# Patient Record
Sex: FEMALE | Race: BLACK OR AFRICAN AMERICAN | HISPANIC OR LATINO | Employment: PART TIME | URBAN - METROPOLITAN AREA
[De-identification: names, ages, dates, MRNs, and addresses within clinical notes are randomized per-mention and may not be internally consistent; named-entity substitution may affect disease eponyms.]

---

## 2018-01-17 NOTE — MISCELLANEOUS
Provider Comments  Provider Comments:   No show for 45 minute EDX study  Signatures   Electronically signed by : Heather Sommer DO;  Apr 27 2016  3:42PM EST                       (Author)

## 2020-08-18 ENCOUNTER — TRANSCRIBE ORDERS (OUTPATIENT)
Dept: URGENT CARE | Facility: MEDICAL CENTER | Age: 35
End: 2020-08-18

## 2020-08-18 DIAGNOSIS — Z02.1 PHYSICAL EXAM, PRE-EMPLOYMENT: ICD-10-CM

## 2020-08-18 DIAGNOSIS — Z02.1 PHYSICAL EXAM, PRE-EMPLOYMENT: Primary | ICD-10-CM

## 2020-08-18 DIAGNOSIS — Z02.1 PRE-EMPLOYMENT HEALTH SCREENING EXAMINATION: Primary | ICD-10-CM

## 2020-09-08 ENCOUNTER — APPOINTMENT (OUTPATIENT)
Dept: LAB | Facility: MEDICAL CENTER | Age: 35
End: 2020-09-08

## 2020-09-08 DIAGNOSIS — Z02.1 PRE-EMPLOYMENT HEALTH SCREENING EXAMINATION: ICD-10-CM

## 2020-09-08 LAB — RUBV IGG SERPL IA-ACNC: >175 IU/ML

## 2020-09-08 PROCEDURE — 36415 COLL VENOUS BLD VENIPUNCTURE: CPT

## 2020-09-08 PROCEDURE — 86765 RUBEOLA ANTIBODY: CPT

## 2020-09-08 PROCEDURE — 86762 RUBELLA ANTIBODY: CPT

## 2020-09-08 PROCEDURE — 86480 TB TEST CELL IMMUN MEASURE: CPT

## 2020-09-08 PROCEDURE — 86787 VARICELLA-ZOSTER ANTIBODY: CPT

## 2020-09-08 PROCEDURE — 86735 MUMPS ANTIBODY: CPT

## 2020-09-09 LAB
GAMMA INTERFERON BACKGROUND BLD IA-ACNC: 0.01 IU/ML
M TB IFN-G BLD-IMP: NEGATIVE
M TB IFN-G CD4+ BCKGRND COR BLD-ACNC: 0 IU/ML
M TB IFN-G CD4+ BCKGRND COR BLD-ACNC: 0.01 IU/ML
MITOGEN IGNF BCKGRD COR BLD-ACNC: >10 IU/ML

## 2020-09-10 LAB
MEV IGG SER QL: NORMAL
MUV IGG SER QL: NORMAL
VZV IGG SER IA-ACNC: ABNORMAL

## 2020-10-06 ENCOUNTER — OFFICE VISIT (OUTPATIENT)
Dept: URGENT CARE | Facility: MEDICAL CENTER | Age: 35
End: 2020-10-06
Payer: COMMERCIAL

## 2020-10-06 VITALS
DIASTOLIC BLOOD PRESSURE: 76 MMHG | TEMPERATURE: 97.9 F | SYSTOLIC BLOOD PRESSURE: 115 MMHG | OXYGEN SATURATION: 98 % | HEART RATE: 70 BPM | RESPIRATION RATE: 20 BRPM

## 2020-10-06 DIAGNOSIS — J01.00 ACUTE MAXILLARY SINUSITIS, RECURRENCE NOT SPECIFIED: Primary | ICD-10-CM

## 2020-10-06 PROCEDURE — G0382 LEV 3 HOSP TYPE B ED VISIT: HCPCS | Performed by: PHYSICIAN ASSISTANT

## 2020-10-06 RX ORDER — ALBUTEROL SULFATE 2.5 MG/3ML
SOLUTION RESPIRATORY (INHALATION)
COMMUNITY

## 2020-10-06 RX ORDER — AMOXICILLIN AND CLAVULANATE POTASSIUM 875; 125 MG/1; MG/1
1 TABLET, FILM COATED ORAL EVERY 12 HOURS SCHEDULED
Qty: 14 TABLET | Refills: 0 | Status: SHIPPED | OUTPATIENT
Start: 2020-10-06 | End: 2020-10-13

## 2020-10-20 ENCOUNTER — OFFICE VISIT (OUTPATIENT)
Dept: URGENT CARE | Facility: MEDICAL CENTER | Age: 35
End: 2020-10-20
Payer: COMMERCIAL

## 2020-10-20 VITALS
DIASTOLIC BLOOD PRESSURE: 78 MMHG | OXYGEN SATURATION: 98 % | RESPIRATION RATE: 18 BRPM | HEART RATE: 77 BPM | TEMPERATURE: 97.4 F | SYSTOLIC BLOOD PRESSURE: 138 MMHG

## 2020-10-20 DIAGNOSIS — T78.40XA ALLERGIC REACTION, INITIAL ENCOUNTER: Primary | ICD-10-CM

## 2020-10-20 PROCEDURE — G0382 LEV 3 HOSP TYPE B ED VISIT: HCPCS | Performed by: PHYSICIAN ASSISTANT

## 2020-10-20 RX ORDER — PREDNISONE 20 MG/1
40 TABLET ORAL ONCE
Status: COMPLETED | OUTPATIENT
Start: 2020-10-20 | End: 2020-10-20

## 2020-10-20 RX ORDER — PREDNISONE 20 MG/1
20 TABLET ORAL DAILY
Qty: 5 TABLET | Refills: 0 | Status: SHIPPED | OUTPATIENT
Start: 2020-10-20 | End: 2020-10-25

## 2020-10-20 RX ORDER — DIPHENHYDRAMINE HCL 25 MG
25 CAPSULE ORAL ONCE
Status: COMPLETED | OUTPATIENT
Start: 2020-10-20 | End: 2020-10-20

## 2020-10-20 RX ADMIN — PREDNISONE 40 MG: 20 TABLET ORAL at 09:51

## 2020-10-20 RX ADMIN — Medication 25 MG: at 09:51

## 2020-10-29 ENCOUNTER — OFFICE VISIT (OUTPATIENT)
Dept: FAMILY MEDICINE CLINIC | Facility: CLINIC | Age: 35
End: 2020-10-29
Payer: COMMERCIAL

## 2020-10-29 VITALS
RESPIRATION RATE: 16 BRPM | SYSTOLIC BLOOD PRESSURE: 112 MMHG | HEIGHT: 62 IN | HEART RATE: 68 BPM | OXYGEN SATURATION: 96 % | DIASTOLIC BLOOD PRESSURE: 74 MMHG | BODY MASS INDEX: 43.61 KG/M2 | WEIGHT: 237 LBS | TEMPERATURE: 98 F

## 2020-10-29 DIAGNOSIS — T75.3XXA MOTION SICKNESS, INITIAL ENCOUNTER: ICD-10-CM

## 2020-10-29 DIAGNOSIS — E66.01 CLASS 3 SEVERE OBESITY DUE TO EXCESS CALORIES WITHOUT SERIOUS COMORBIDITY WITH BODY MASS INDEX (BMI) OF 40.0 TO 44.9 IN ADULT (HCC): Primary | ICD-10-CM

## 2020-10-29 DIAGNOSIS — N94.6 DYSMENORRHEA: ICD-10-CM

## 2020-10-29 DIAGNOSIS — Z79.1 NSAID LONG-TERM USE: ICD-10-CM

## 2020-10-29 PROCEDURE — 99204 OFFICE O/P NEW MOD 45 MIN: CPT | Performed by: FAMILY MEDICINE

## 2020-10-29 RX ORDER — MELOXICAM 15 MG/1
15 TABLET ORAL DAILY
Qty: 30 TABLET | Refills: 0 | Status: SHIPPED | OUTPATIENT
Start: 2020-10-29 | End: 2020-11-12 | Stop reason: ALTCHOICE

## 2020-10-29 RX ORDER — ALBUTEROL SULFATE 90 UG/1
2 AEROSOL, METERED RESPIRATORY (INHALATION) EVERY 6 HOURS PRN
COMMUNITY

## 2020-10-29 RX ORDER — SCOLOPAMINE TRANSDERMAL SYSTEM 1 MG/1
1 PATCH, EXTENDED RELEASE TRANSDERMAL
Qty: 4 PATCH | Refills: 0 | Status: SHIPPED | OUTPATIENT
Start: 2020-10-29 | End: 2020-11-12 | Stop reason: ALTCHOICE

## 2020-10-30 ENCOUNTER — TELEPHONE (OUTPATIENT)
Dept: FAMILY MEDICINE CLINIC | Facility: CLINIC | Age: 35
End: 2020-10-30

## 2020-11-12 ENCOUNTER — CONSULT (OUTPATIENT)
Dept: BARIATRICS | Facility: CLINIC | Age: 35
End: 2020-11-12
Payer: COMMERCIAL

## 2020-11-12 VITALS
SYSTOLIC BLOOD PRESSURE: 118 MMHG | TEMPERATURE: 98.9 F | BODY MASS INDEX: 43.76 KG/M2 | HEIGHT: 62 IN | HEART RATE: 65 BPM | WEIGHT: 237.8 LBS | DIASTOLIC BLOOD PRESSURE: 78 MMHG

## 2020-11-12 DIAGNOSIS — E66.01 CLASS 3 SEVERE OBESITY DUE TO EXCESS CALORIES WITHOUT SERIOUS COMORBIDITY WITH BODY MASS INDEX (BMI) OF 40.0 TO 44.9 IN ADULT (HCC): ICD-10-CM

## 2020-11-12 DIAGNOSIS — E66.01 OBESITY, CLASS III, BMI 40-49.9 (MORBID OBESITY) (HCC): Primary | ICD-10-CM

## 2020-11-12 PROBLEM — E66.813 OBESITY, CLASS III, BMI 40-49.9 (MORBID OBESITY): Status: ACTIVE | Noted: 2020-11-12

## 2020-11-12 PROCEDURE — 99243 OFF/OP CNSLTJ NEW/EST LOW 30: CPT | Performed by: PHYSICIAN ASSISTANT

## 2020-11-12 RX ORDER — NICOTINE POLACRILEX 2 MG
GUM BUCCAL
COMMUNITY
End: 2022-06-01

## 2020-11-12 RX ORDER — MULTIVIT-MIN/IRON FUM/FOLIC AC 7.5 MG-4
1 TABLET ORAL DAILY
COMMUNITY

## 2020-12-08 ENCOUNTER — LAB (OUTPATIENT)
Dept: LAB | Facility: MEDICAL CENTER | Age: 35
End: 2020-12-08
Payer: COMMERCIAL

## 2020-12-08 ENCOUNTER — APPOINTMENT (OUTPATIENT)
Dept: RADIOLOGY | Facility: MEDICAL CENTER | Age: 35
End: 2020-12-08
Payer: COMMERCIAL

## 2020-12-08 ENCOUNTER — TRANSCRIBE ORDERS (OUTPATIENT)
Dept: ADMINISTRATIVE | Facility: HOSPITAL | Age: 35
End: 2020-12-08

## 2020-12-08 DIAGNOSIS — J45.50 SEVERE PERSISTENT ASTHMA, UNSPECIFIED WHETHER COMPLICATED: ICD-10-CM

## 2020-12-08 DIAGNOSIS — E66.01 CLASS 3 SEVERE OBESITY DUE TO EXCESS CALORIES WITHOUT SERIOUS COMORBIDITY WITH BODY MASS INDEX (BMI) OF 40.0 TO 44.9 IN ADULT (HCC): ICD-10-CM

## 2020-12-08 DIAGNOSIS — L50.1 IDIOPATHIC URTICARIA: Primary | ICD-10-CM

## 2020-12-08 DIAGNOSIS — L50.1 IDIOPATHIC URTICARIA: ICD-10-CM

## 2020-12-08 LAB
25(OH)D3 SERPL-MCNC: 23.9 NG/ML (ref 30–100)
ALBUMIN SERPL BCP-MCNC: 4 G/DL (ref 3.5–5)
ALP SERPL-CCNC: 69 U/L (ref 46–116)
ALT SERPL W P-5'-P-CCNC: 20 U/L (ref 12–78)
ANION GAP SERPL CALCULATED.3IONS-SCNC: 5 MMOL/L (ref 4–13)
AST SERPL W P-5'-P-CCNC: 14 U/L (ref 5–45)
BASOPHILS # BLD AUTO: 0.03 THOUSANDS/ΜL (ref 0–0.1)
BASOPHILS NFR BLD AUTO: 1 % (ref 0–1)
BILIRUB SERPL-MCNC: 0.69 MG/DL (ref 0.2–1)
BUN SERPL-MCNC: 13 MG/DL (ref 5–25)
C3 SERPL-MCNC: 131 MG/DL (ref 90–180)
C4 SERPL-MCNC: 29 MG/DL (ref 10–40)
CALCIUM SERPL-MCNC: 9.5 MG/DL (ref 8.3–10.1)
CHLORIDE SERPL-SCNC: 106 MMOL/L (ref 100–108)
CO2 SERPL-SCNC: 27 MMOL/L (ref 21–32)
CREAT SERPL-MCNC: 0.83 MG/DL (ref 0.6–1.3)
CRP SERPL QL: 18.7 MG/L
EOSINOPHIL # BLD AUTO: 0.24 THOUSAND/ΜL (ref 0–0.61)
EOSINOPHIL NFR BLD AUTO: 4 % (ref 0–6)
ERYTHROCYTE [DISTWIDTH] IN BLOOD BY AUTOMATED COUNT: 13.3 % (ref 11.6–15.1)
EST. AVERAGE GLUCOSE BLD GHB EST-MCNC: 105 MG/DL
GFR SERPL CREATININE-BSD FRML MDRD: 104 ML/MIN/1.73SQ M
GLUCOSE SERPL-MCNC: 76 MG/DL (ref 65–140)
HBA1C MFR BLD: 5.3 %
HCT VFR BLD AUTO: 43 % (ref 34.8–46.1)
HGB BLD-MCNC: 12.9 G/DL (ref 11.5–15.4)
IMM GRANULOCYTES # BLD AUTO: 0.01 THOUSAND/UL (ref 0–0.2)
IMM GRANULOCYTES NFR BLD AUTO: 0 % (ref 0–2)
LYMPHOCYTES # BLD AUTO: 1.41 THOUSANDS/ΜL (ref 0.6–4.47)
LYMPHOCYTES NFR BLD AUTO: 22 % (ref 14–44)
MCH RBC QN AUTO: 24.8 PG (ref 26.8–34.3)
MCHC RBC AUTO-ENTMCNC: 30 G/DL (ref 31.4–37.4)
MCV RBC AUTO: 83 FL (ref 82–98)
MONOCYTES # BLD AUTO: 0.51 THOUSAND/ΜL (ref 0.17–1.22)
MONOCYTES NFR BLD AUTO: 8 % (ref 4–12)
NEUTROPHILS # BLD AUTO: 4.32 THOUSANDS/ΜL (ref 1.85–7.62)
NEUTS SEG NFR BLD AUTO: 65 % (ref 43–75)
NRBC BLD AUTO-RTO: 0 /100 WBCS
PLATELET # BLD AUTO: 248 THOUSANDS/UL (ref 149–390)
PMV BLD AUTO: 12.2 FL (ref 8.9–12.7)
POTASSIUM SERPL-SCNC: 4.1 MMOL/L (ref 3.5–5.3)
PROT SERPL-MCNC: 7.9 G/DL (ref 6.4–8.2)
RBC # BLD AUTO: 5.2 MILLION/UL (ref 3.81–5.12)
RHEUMATOID FACT SER QL LA: NEGATIVE
SODIUM SERPL-SCNC: 138 MMOL/L (ref 136–145)
TSH SERPL DL<=0.05 MIU/L-ACNC: 1.38 UIU/ML (ref 0.36–3.74)
WBC # BLD AUTO: 6.52 THOUSAND/UL (ref 4.31–10.16)

## 2020-12-08 PROCEDURE — 84165 PROTEIN E-PHORESIS SERUM: CPT

## 2020-12-08 PROCEDURE — 86430 RHEUMATOID FACTOR TEST QUAL: CPT

## 2020-12-08 PROCEDURE — 86376 MICROSOMAL ANTIBODY EACH: CPT

## 2020-12-08 PROCEDURE — 80053 COMPREHEN METABOLIC PANEL: CPT | Performed by: ALLERGY & IMMUNOLOGY

## 2020-12-08 PROCEDURE — 83883 ASSAY NEPHELOMETRY NOT SPEC: CPT

## 2020-12-08 PROCEDURE — 86800 THYROGLOBULIN ANTIBODY: CPT

## 2020-12-08 PROCEDURE — 83520 IMMUNOASSAY QUANT NOS NONAB: CPT

## 2020-12-08 PROCEDURE — 85025 COMPLETE CBC W/AUTO DIFF WBC: CPT | Performed by: ALLERGY & IMMUNOLOGY

## 2020-12-08 PROCEDURE — 86140 C-REACTIVE PROTEIN: CPT | Performed by: ALLERGY & IMMUNOLOGY

## 2020-12-08 PROCEDURE — 83036 HEMOGLOBIN GLYCOSYLATED A1C: CPT

## 2020-12-08 PROCEDURE — 71046 X-RAY EXAM CHEST 2 VIEWS: CPT

## 2020-12-08 PROCEDURE — 36415 COLL VENOUS BLD VENIPUNCTURE: CPT | Performed by: ALLERGY & IMMUNOLOGY

## 2020-12-08 PROCEDURE — 84165 PROTEIN E-PHORESIS SERUM: CPT | Performed by: PATHOLOGY

## 2020-12-08 PROCEDURE — 86038 ANTINUCLEAR ANTIBODIES: CPT

## 2020-12-08 PROCEDURE — 84443 ASSAY THYROID STIM HORMONE: CPT | Performed by: ALLERGY & IMMUNOLOGY

## 2020-12-08 PROCEDURE — 82785 ASSAY OF IGE: CPT

## 2020-12-08 PROCEDURE — 84432 ASSAY OF THYROGLOBULIN: CPT

## 2020-12-08 PROCEDURE — 82306 VITAMIN D 25 HYDROXY: CPT

## 2020-12-08 PROCEDURE — 86160 COMPLEMENT ANTIGEN: CPT

## 2020-12-08 PROCEDURE — 86162 COMPLEMENT TOTAL (CH50): CPT

## 2020-12-09 LAB
CH50 SERPL-ACNC: 57 U/ML
KAPPA LC FREE SER-MCNC: 15.7 MG/L (ref 3.3–19.4)
KAPPA LC FREE/LAMBDA FREE SER: 0.94 {RATIO} (ref 0.26–1.65)
LAMBDA LC FREE SERPL-MCNC: 16.7 MG/L (ref 5.7–26.3)
RYE IGE QN: NEGATIVE
THYROGLOB AB SERPL-ACNC: <1 IU/ML (ref 0–0.9)
THYROGLOB AB SERPL-ACNC: <1 IU/ML (ref 0–0.9)
THYROGLOB SERPL-MCNC: 9.6 NG/ML (ref 1.5–38.5)
THYROPEROXIDASE AB SERPL-ACNC: <9 IU/ML (ref 0–34)
TOTAL IGE SMQN RAST: 597 KU/L (ref 0–113)

## 2020-12-10 LAB
ALBUMIN SERPL ELPH-MCNC: 4.2 G/DL (ref 3.5–5)
ALBUMIN SERPL ELPH-MCNC: 58.3 % (ref 52–65)
ALPHA1 GLOB SERPL ELPH-MCNC: 0.35 G/DL (ref 0.1–0.4)
ALPHA1 GLOB SERPL ELPH-MCNC: 4.8 % (ref 2.5–5)
ALPHA2 GLOB SERPL ELPH-MCNC: 0.78 G/DL (ref 0.4–1.2)
ALPHA2 GLOB SERPL ELPH-MCNC: 10.9 % (ref 7–13)
BETA GLOB ABNORMAL SERPL ELPH-MCNC: 0.43 G/DL (ref 0.4–0.8)
BETA1 GLOB SERPL ELPH-MCNC: 6 % (ref 5–13)
BETA2 GLOB SERPL ELPH-MCNC: 4.3 % (ref 2–8)
BETA2+GAMMA GLOB SERPL ELPH-MCNC: 0.31 G/DL (ref 0.2–0.5)
GAMMA GLOB ABNORMAL SERPL ELPH-MCNC: 1.13 G/DL (ref 0.5–1.6)
GAMMA GLOB SERPL ELPH-MCNC: 15.7 % (ref 12–22)
IGG/ALB SER: 1.4 {RATIO} (ref 1.1–1.8)
PROT PATTERN SERPL ELPH-IMP: NORMAL
PROT SERPL-MCNC: 7.2 G/DL (ref 6.4–8.2)
TRYPTASE SERPL-MCNC: 6.1 UG/L (ref 2.2–13.2)

## 2020-12-14 ENCOUNTER — OFFICE VISIT (OUTPATIENT)
Dept: OBGYN CLINIC | Facility: MEDICAL CENTER | Age: 35
End: 2020-12-14
Payer: COMMERCIAL

## 2020-12-14 VITALS — WEIGHT: 240 LBS | DIASTOLIC BLOOD PRESSURE: 62 MMHG | SYSTOLIC BLOOD PRESSURE: 118 MMHG | BODY MASS INDEX: 43.9 KG/M2

## 2020-12-14 DIAGNOSIS — B96.89 BACTERIAL VAGINOSIS: ICD-10-CM

## 2020-12-14 DIAGNOSIS — N76.0 BACTERIAL VAGINOSIS: ICD-10-CM

## 2020-12-14 DIAGNOSIS — B37.3 YEAST VAGINITIS: ICD-10-CM

## 2020-12-14 DIAGNOSIS — Z11.3 SCREEN FOR STD (SEXUALLY TRANSMITTED DISEASE): Primary | ICD-10-CM

## 2020-12-14 PROBLEM — E55.9 VITAMIN D DEFICIENCY: Status: ACTIVE | Noted: 2018-10-24

## 2020-12-14 PROBLEM — Z91.018 MULTIPLE FOOD ALLERGIES: Status: ACTIVE | Noted: 2018-10-24

## 2020-12-14 PROCEDURE — 99201 PR OFFICE OUTPATIENT NEW 10 MINUTES: CPT | Performed by: PHYSICIAN ASSISTANT

## 2020-12-14 PROCEDURE — 87591 N.GONORRHOEAE DNA AMP PROB: CPT | Performed by: PHYSICIAN ASSISTANT

## 2020-12-14 PROCEDURE — 87491 CHLMYD TRACH DNA AMP PROBE: CPT | Performed by: PHYSICIAN ASSISTANT

## 2020-12-14 RX ORDER — PREDNISONE 10 MG/1
TABLET ORAL
COMMUNITY
Start: 2020-12-07 | End: 2021-02-04 | Stop reason: ALTCHOICE

## 2020-12-14 RX ORDER — FLUCONAZOLE 150 MG/1
TABLET ORAL
Qty: 2 TABLET | Refills: 0 | Status: SHIPPED | OUTPATIENT
Start: 2020-12-14 | End: 2020-12-17

## 2020-12-14 RX ORDER — TINIDAZOLE 500 MG/1
TABLET ORAL
Qty: 10 TABLET | Refills: 0 | Status: SHIPPED | OUTPATIENT
Start: 2020-12-14 | End: 2020-12-19

## 2020-12-15 ENCOUNTER — TELEPHONE (OUTPATIENT)
Dept: OBGYN CLINIC | Facility: MEDICAL CENTER | Age: 35
End: 2020-12-15

## 2020-12-18 LAB
C TRACH DNA SPEC QL NAA+PROBE: NEGATIVE
N GONORRHOEA DNA SPEC QL NAA+PROBE: NEGATIVE

## 2020-12-23 ENCOUNTER — TELEPHONE (OUTPATIENT)
Dept: ADMINISTRATIVE | Facility: OTHER | Age: 35
End: 2020-12-23

## 2020-12-23 ENCOUNTER — OFFICE VISIT (OUTPATIENT)
Dept: FAMILY MEDICINE CLINIC | Facility: CLINIC | Age: 35
End: 2020-12-23
Payer: COMMERCIAL

## 2020-12-23 VITALS
HEART RATE: 56 BPM | TEMPERATURE: 98 F | WEIGHT: 237 LBS | OXYGEN SATURATION: 97 % | SYSTOLIC BLOOD PRESSURE: 88 MMHG | DIASTOLIC BLOOD PRESSURE: 60 MMHG | RESPIRATION RATE: 16 BRPM | BODY MASS INDEX: 43.61 KG/M2 | HEIGHT: 62 IN

## 2020-12-23 DIAGNOSIS — S16.1XXA STRAIN OF NECK MUSCLE, INITIAL ENCOUNTER: Primary | ICD-10-CM

## 2020-12-23 PROCEDURE — 99214 OFFICE O/P EST MOD 30 MIN: CPT | Performed by: INTERNAL MEDICINE

## 2020-12-23 RX ORDER — CYCLOBENZAPRINE HCL 10 MG
10 TABLET ORAL 3 TIMES DAILY PRN
Qty: 15 TABLET | Refills: 0 | Status: SHIPPED | OUTPATIENT
Start: 2020-12-23 | End: 2021-04-29 | Stop reason: ALTCHOICE

## 2020-12-23 RX ORDER — FEXOFENADINE HYDROCHLORIDE 60 MG/1
60 TABLET, FILM COATED ORAL DAILY
COMMUNITY
End: 2021-04-29 | Stop reason: ALTCHOICE

## 2020-12-23 RX ORDER — METHYLPREDNISOLONE 4 MG/1
TABLET ORAL
Qty: 21 EACH | Refills: 0 | Status: SHIPPED | OUTPATIENT
Start: 2020-12-23 | End: 2021-02-04 | Stop reason: ALTCHOICE

## 2020-12-23 RX ORDER — MELOXICAM 15 MG/1
15 TABLET ORAL DAILY
Qty: 30 TABLET | Refills: 1 | Status: SHIPPED | OUTPATIENT
Start: 2020-12-23 | End: 2021-04-29 | Stop reason: HOSPADM

## 2020-12-23 NOTE — TELEPHONE ENCOUNTER
----- Message from Fahad Duffy sent at 12/22/2020  2:34 PM EST -----  Regarding: Jojo Her Prac  Contact: [phone]  12/22/20 2:34 PM    Hello, our patient Harper Combs has had Pap Smear (HPV) aka Cervical Cancer Screening completed/performed  Please assist in updating the patient chart by pulling the Care Everywhere (CE) document  The date of service is 09/10/2019       Thank you,  ASHLEY Duffy PG

## 2021-01-11 NOTE — TELEPHONE ENCOUNTER
Upon review of the In Basket request we were able to locate, review, and update the patient chart as requested for Pap Smear (HPV) aka Cervical Cancer Screening  Any additional questions or concerns should be emailed to the Practice Liaisons via [email or web address] email, please do not reply via In Basket      Thank you  Elvis Amezcua MA

## 2021-02-04 ENCOUNTER — TELEMEDICINE (OUTPATIENT)
Dept: FAMILY MEDICINE CLINIC | Facility: CLINIC | Age: 36
End: 2021-02-04
Payer: COMMERCIAL

## 2021-02-04 DIAGNOSIS — J02.9 PHARYNGITIS, UNSPECIFIED ETIOLOGY: Primary | ICD-10-CM

## 2021-02-04 DIAGNOSIS — B34.9 VIRAL INFECTION, UNSPECIFIED: ICD-10-CM

## 2021-02-04 DIAGNOSIS — Z03.818 ENCOUNTER FOR OBSERVATION FOR SUSPECTED EXPOSURE TO OTHER BIOLOGICAL AGENTS RULED OUT: ICD-10-CM

## 2021-02-04 PROCEDURE — 87635 SARS-COV-2 COVID-19 AMP PRB: CPT | Performed by: PHYSICIAN ASSISTANT

## 2021-02-04 PROCEDURE — 99214 OFFICE O/P EST MOD 30 MIN: CPT | Performed by: PHYSICIAN ASSISTANT

## 2021-02-04 RX ORDER — AMOXICILLIN 500 MG/1
500 CAPSULE ORAL EVERY 8 HOURS SCHEDULED
Qty: 30 CAPSULE | Refills: 0 | Status: SHIPPED | OUTPATIENT
Start: 2021-02-04 | End: 2021-02-14

## 2021-02-04 NOTE — PROGRESS NOTES
COVID-19 Virtual Visit     Assessment/Plan:    Problem List Items Addressed This Visit     None      Visit Diagnoses     Pharyngitis, unspecified etiology    -  Primary    Relevant Medications    amoxicillin (AMOXIL) 500 mg capsule    Encounter for observation for suspected exposure to other biological agents ruled out        Relevant Orders    Novel Coronavirus (Covid-19),PCR [facility] - Collected in Office    Viral infection, unspecified        Relevant Orders    Novel Coronavirus (Covid-19),PCR [facility] - Collected in Office         Disposition:     I recommended the patient to come to our office to perform PCR testing for COVID-19  I have spent 7 minutes directly with the patient  Encounter provider Kristina Bales PA-C    Provider located at [address] [place] [id number] [phone]    Recent Visits  No visits were found meeting these conditions  Showing recent visits within past 7 days and meeting all other requirements     Today's Visits  Date Type Provider Dept   02/04/21 Telemedicine Kristina Bales PA-C  José Miguel Miller   Showing today's visits and meeting all other requirements     Future Appointments  No visits were found meeting these conditions  Showing future appointments within next 150 days and meeting all other requirements      This virtual check-in was done via [employer] and patient was informed that this is not a secure, HIPAA-compliant platform  She agrees to proceed  Patient agrees to participate in a virtual check in via telephone or video visit instead of presenting to the office to address urgent/immediate medical needs  Patient is aware this is a billable service  After connecting through [facility], the patient was identified by name and date of birth  Kaye Fischer was informed that this was a telemedicine visit and that the exam was being conducted confidentially over secure lines   My office door was closed  No one else was in the room  Paradise Denise acknowledged consent and understanding of privacy and security of the telemedicine visit  I informed the patient that I have reviewed her record in Epic and presented the opportunity for her to ask any questions regarding the visit today  The patient agreed to participate  Subjective:   Paradise Denise is a 38 y o  female who is concerned about COVID-19  Patient's symptoms include chills, fatigue, sore throat and myalgias  Patient denies fever, anosmia, loss of taste, cough, nausea, vomiting and diarrhea       Date of symptom onset: 2/2/2021    Exposure:   Contact with a person who is under investigation (PUI) for or who is positive for COVID-19 within the last 14 days?: No    Hospitalized recently for fever and/or lower respiratory symptoms?: No      Currently a healthcare worker that is involved in direct patient care?: No      Works in a special setting where the risk of COVID-19 transmission may be high? (this may include long-term care, correctional and group home facilities; homeless shelters; assisted-living facilities and group homes ): No      Resident in a special setting where the risk of COVID-19 transmission may be high? (this may include long-term care, correctional and group home facilities; homeless shelters; assisted-living facilities and group homes ): No      No results found for: [facility] [address] [facility], [address] [place] [address],[address], [place] [address]  Past Medical History:   Diagnosis Date    Allergic     Asthma     GERD (gastroesophageal reflux disease)     Morbid obesity with BMI of 40 0-44 9, adult ([facility] [address] )      Past Surgical History:   Procedure Laterality Date    CHOLECYSTECTOMY      TONSILECTOMY AND ADNOIDECTOMY      TUBAL LIGATION       Current Outpatient Medications   Medication Sig Dispense Refill    albuterol (2 5 mg/3 mL) 0 083 % nebulizer solution Inhale      albuterol (PROVENTIL HFA,VENTOLIN HFA) 90 mcg/act inhaler Inhale 2 puffs every 6 (six) hours as needed for wheezing      amoxicillin (AMOXIL) 500 mg capsule Take 1 capsule (500 mg total) by mouth every 8 (eight) hours for 10 days 30 capsule 0    Biotin 1 MG CAPS Take by mouth      cyclobenzaprine (FLEXERIL) 10 mg tablet Take 1 tablet (10 mg total) by mouth 3 (three) times a day as needed for muscle spasms 15 tablet 0    fexofenadine (ALLEGRA) 60 MG tablet Take 60 mg by mouth daily      meloxicam (MOBIC) 15 mg tablet Take 1 tablet (15 mg total) by mouth daily 30 tablet 1    Multiple Vitamins-Minerals (multivitamin with minerals) tablet Take 1 tablet by mouth daily       No current facility-administered medications for this visit  Allergies   Allergen Reactions    Lac Bovis Angioedema    Shrimp Extract Allergy Skin Test Angioedema    Banana Other (See Comments)     ulcerations    Fruit Extracts      Annotation - [id number]: bananas, kiwi, pineapples    Kiwi Extract Other (See Comments)     tongue swelling    Latex Other (See Comments)     pt warned to avoid due to food allergies    Pineapple Hives    Pollen Extract        Review of Systems   Constitutional: Positive for chills and fatigue  Negative for fever  HENT: Positive for sore throat  Respiratory: Negative for cough  Gastrointestinal: Negative for diarrhea, nausea and vomiting  Musculoskeletal: Positive for myalgias  Objective: There were no vitals filed for this visit  Physical Exam  Constitutional:       General: She is not in acute distress  Appearance: She is obese  She is not ill-appearing  HENT:      Head: Normocephalic and atraumatic  Right Ear: External ear normal       Left Ear: External ear normal    Pulmonary:      Effort: Pulmonary effort is normal    Skin:     Coloration: Skin is not pale  Findings: No erythema  Neurological:      Mental Status: She is oriented to person, place, and time         VIRTUAL VISIT DISCLAIMER    Judah Bruce acknowledges that she has consented to an online visit or consultation  She understands that the online visit is based solely on information provided by her, and that, in the absence of a face-to-face physical evaluation by the physician, the diagnosis she receives is both limited and provisional in terms of accuracy and completeness  This is not intended to replace a full medical face-to-face evaluation by the physician  Akin Mcrae understands and accepts these terms

## 2021-02-05 LAB — SARS-COV-2 RNA RESP QL NAA+PROBE: NEGATIVE

## 2021-02-10 ENCOUNTER — OFFICE VISIT (OUTPATIENT)
Dept: URGENT CARE | Facility: MEDICAL CENTER | Age: 36
End: 2021-02-10
Payer: COMMERCIAL

## 2021-02-10 VITALS
TEMPERATURE: 98 F | SYSTOLIC BLOOD PRESSURE: 123 MMHG | BODY MASS INDEX: 44.35 KG/M2 | OXYGEN SATURATION: 96 % | RESPIRATION RATE: 18 BRPM | HEIGHT: 62 IN | WEIGHT: 241 LBS | DIASTOLIC BLOOD PRESSURE: 69 MMHG | HEART RATE: 70 BPM

## 2021-02-10 DIAGNOSIS — R22.0 SWOLLEN LIP: Primary | ICD-10-CM

## 2021-02-10 PROCEDURE — G0382 LEV 3 HOSP TYPE B ED VISIT: HCPCS | Performed by: PHYSICIAN ASSISTANT

## 2021-02-10 RX ORDER — MONTELUKAST SODIUM 10 MG/1
10 TABLET ORAL EVERY EVENING
COMMUNITY
Start: 2021-01-19 | End: 2021-04-29 | Stop reason: ALTCHOICE

## 2021-02-10 RX ORDER — PREDNISONE 20 MG/1
40 TABLET ORAL DAILY
Qty: 10 TABLET | Refills: 0 | Status: SHIPPED | OUTPATIENT
Start: 2021-02-10 | End: 2021-02-15

## 2021-02-11 NOTE — PATIENT INSTRUCTIONS
Swollen lip  Prednisone 40 mg daily x 5 days  Follow up with PCP in 3-5 days  Proceed to  ER if symptoms worsen

## 2021-02-11 NOTE — PROGRESS NOTES
[facility]        NAME: Alka Archuleta is a 38 y o  female  : 1985    MRN: [id number]  DATE: February 10, 2021  TIME: 9:10 PM    Assessment and Plan   Swollen lip [R22 0]  1  Swollen lip  predniSONE 20 mg tablet         Patient Instructions     Swollen lip  Prednisone 40 mg daily x 5 days  Follow up with PCP in 3-5 days  Proceed to  ER if symptoms worsen  Chief Complaint     Chief Complaint   Patient presents with    Allergic Reaction     Pt  has swelling to her eyes and lower lip that began this evening  History of Present Illness       37 y/o female presents c/o swelling of lip and eyelid  Patient states she gets this reaction with cold weather  Denies difficulty swallowing, wheezing  States she has used prednisone for these episodes in the past      Review of Systems   Review of Systems   Constitutional: Negative  HENT: Negative  Eyes: Negative  Respiratory: Negative  Negative for cough, chest tightness, shortness of breath, wheezing and stridor  Cardiovascular: Negative  Negative for chest pain, palpitations and leg swelling           Current Medications       Current Outpatient Medications:     albuterol (2 5 mg/3 mL) 0 083 % nebulizer solution, Inhale, Disp: , Rfl:     albuterol (PROVENTIL HFA,VENTOLIN HFA) 90 mcg/act inhaler, Inhale 2 puffs every 6 (six) hours as needed for wheezing, Disp: , Rfl:     amoxicillin (AMOXIL) 500 mg capsule, Take 1 capsule (500 mg total) by mouth every 8 (eight) hours for 10 days, Disp: 30 capsule, Rfl: 0    fexofenadine (ALLEGRA) 60 MG tablet, Take 60 mg by mouth daily, Disp: , Rfl:     montelukast (SINGULAIR) 10 mg tablet, Take 10 mg by mouth every evening, Disp: , Rfl:     Biotin 1 MG CAPS, Take by mouth, Disp: , Rfl:     cyclobenzaprine (FLEXERIL) 10 mg tablet, Take 1 tablet (10 mg total) by mouth 3 (three) times a day as needed for muscle spasms (Patient not taking: Reported on 2/10/2021), Disp: 15 tablet, Rfl: 0   meloxicam (MOBIC) 15 mg tablet, Take 1 tablet (15 mg total) by mouth daily (Patient not taking: Reported on 2/10/2021), Disp: 30 tablet, Rfl: 1    Multiple Vitamins-Minerals (multivitamin with minerals) tablet, Take 1 tablet by mouth daily, Disp: , Rfl:     predniSONE 20 mg tablet, Take 2 tablets (40 mg total) by mouth daily for 5 days, Disp: 10 tablet, Rfl: 0    Current Allergies     Allergies as of 02/10/2021 - Reviewed 02/10/2021   Allergen Reaction Noted    Lac bovis Angioedema 02/01/2019    Shrimp extract allergy skin test Angioedema 02/01/2019    Banana Other (See Comments) 03/22/2014    Fruit extracts  12/17/2013    Kiwi extract Other (See Comments) 03/22/2014    Latex Other (See Comments) 03/22/2014    Pineapple Hives 03/22/2014    Pollen extract  06/13/2014            The following portions of the patient's history were reviewed and updated as appropriate: allergies, current medications, past family history, past medical history, past social history, past surgical history and problem list      Past Medical History:   Diagnosis Date    Allergic     Asthma     GERD (gastroesophageal reflux disease)     Morbid obesity with BMI of 40 0-44 9, adult ([facility] [address] )        Past Surgical History:   Procedure Laterality Date    CHOLECYSTECTOMY      TONSILECTOMY AND ADNOIDECTOMY      TUBAL LIGATION         Family History   Problem Relation Age of Onset    Diabetes Mother     Hypertension Mother     Hypertension Father     Cancer Father         melanoma     Seizures Sister     Diabetes Maternal Grandmother     Hypertension Maternal Grandmother     Cancer Maternal Grandmother     Heart disease Maternal Grandmother     Thyroid disease Neg Hx     Stroke Neg Hx          Medications have been verified          Objective   /69   Pulse 70   Temp 98 °F (36 7 °C)   Resp 18   Ht 5' 2" (1 575 m)   Wt 109 kg (241 lb)   SpO2 96%   BMI 44 08 kg/m²        Physical Exam     Physical Exam  Constitutional: General: She is not in acute distress  Appearance: She is well-developed  She is not diaphoretic  HENT:      Head: Normocephalic and atraumatic  Right Ear: Hearing, tympanic membrane, ear canal and external ear normal       Left Ear: Hearing, tympanic membrane, ear canal and external ear normal       Mouth/Throat:      Lips: Pink  Mouth: Mucous membranes are moist       Palate: No mass and lesions  Pharynx: Uvula midline  No pharyngeal swelling or oropharyngeal exudate  Eyes:      Conjunctiva/sclera: Conjunctivae normal       Pupils: Pupils are equal, round, and reactive to light  Neck:      Musculoskeletal: Normal range of motion and neck supple  Cardiovascular:      Rate and Rhythm: Normal rate and regular rhythm  Heart sounds: Normal heart sounds  Pulmonary:      Effort: Pulmonary effort is normal  No respiratory distress  Breath sounds: Normal breath sounds  No stridor  No wheezing, rhonchi or rales  Chest:      Chest wall: No tenderness  Lymphadenopathy:      Cervical: No cervical adenopathy

## 2021-04-10 ENCOUNTER — IMMUNIZATIONS (OUTPATIENT)
Dept: FAMILY MEDICINE CLINIC | Facility: HOSPITAL | Age: 36
End: 2021-04-10

## 2021-04-10 DIAGNOSIS — Z23 ENCOUNTER FOR IMMUNIZATION: Primary | ICD-10-CM

## 2021-04-10 PROCEDURE — 0001A SARS-COV-2 / COVID-19 MRNA VACCINE (PFIZER-BIONTECH) 30 MCG: CPT

## 2021-04-10 PROCEDURE — 91300 SARS-COV-2 / COVID-19 MRNA VACCINE (PFIZER-BIONTECH) 30 MCG: CPT

## 2021-04-20 ENCOUNTER — APPOINTMENT (OUTPATIENT)
Dept: LAB | Facility: MEDICAL CENTER | Age: 36
End: 2021-04-20
Payer: COMMERCIAL

## 2021-04-20 ENCOUNTER — TRANSCRIBE ORDERS (OUTPATIENT)
Dept: ADMINISTRATIVE | Facility: HOSPITAL | Age: 36
End: 2021-04-20

## 2021-04-20 DIAGNOSIS — Z00.8 HEALTH EXAMINATION IN POPULATION SURVEYS: ICD-10-CM

## 2021-04-20 DIAGNOSIS — Z00.8 HEALTH EXAMINATION IN POPULATION SURVEYS: Primary | ICD-10-CM

## 2021-04-20 DIAGNOSIS — E66.01 CLASS 3 SEVERE OBESITY DUE TO EXCESS CALORIES WITHOUT SERIOUS COMORBIDITY WITH BODY MASS INDEX (BMI) OF 40.0 TO 44.9 IN ADULT (HCC): ICD-10-CM

## 2021-04-20 DIAGNOSIS — E66.01 OBESITY, CLASS III, BMI 40-49.9 (MORBID OBESITY) (HCC): ICD-10-CM

## 2021-04-20 LAB
CHOLEST SERPL-MCNC: 155 MG/DL (ref 50–200)
EST. AVERAGE GLUCOSE BLD GHB EST-MCNC: 111 MG/DL
HBA1C MFR BLD: 5.5 %
HDLC SERPL-MCNC: 58 MG/DL
INSULIN SERPL-ACNC: 20.5 MU/L (ref 3–25)
LDLC SERPL CALC-MCNC: 74 MG/DL (ref 0–100)
NONHDLC SERPL-MCNC: 97 MG/DL
TRIGL SERPL-MCNC: 117 MG/DL

## 2021-04-20 PROCEDURE — 83036 HEMOGLOBIN GLYCOSYLATED A1C: CPT

## 2021-04-20 PROCEDURE — 80061 LIPID PANEL: CPT

## 2021-04-20 PROCEDURE — 36415 COLL VENOUS BLD VENIPUNCTURE: CPT

## 2021-04-20 PROCEDURE — 83525 ASSAY OF INSULIN: CPT

## 2021-04-29 ENCOUNTER — OFFICE VISIT (OUTPATIENT)
Dept: FAMILY MEDICINE CLINIC | Facility: CLINIC | Age: 36
End: 2021-04-29
Payer: COMMERCIAL

## 2021-04-29 VITALS
BODY MASS INDEX: 43.98 KG/M2 | OXYGEN SATURATION: 99 % | RESPIRATION RATE: 16 BRPM | DIASTOLIC BLOOD PRESSURE: 76 MMHG | HEIGHT: 62 IN | TEMPERATURE: 97.7 F | WEIGHT: 239 LBS | HEART RATE: 58 BPM | SYSTOLIC BLOOD PRESSURE: 106 MMHG

## 2021-04-29 DIAGNOSIS — J45.20 MILD INTERMITTENT ASTHMA WITHOUT COMPLICATION: ICD-10-CM

## 2021-04-29 DIAGNOSIS — E66.01 OBESITY, CLASS III, BMI 40-49.9 (MORBID OBESITY) (HCC): Primary | ICD-10-CM

## 2021-04-29 PROCEDURE — 99214 OFFICE O/P EST MOD 30 MIN: CPT | Performed by: FAMILY MEDICINE

## 2021-04-29 RX ORDER — PHENTERMINE HYDROCHLORIDE 37.5 MG/1
37.5 TABLET ORAL DAILY PRN
Qty: 30 TABLET | Refills: 0 | Status: SHIPPED | OUTPATIENT
Start: 2021-04-29 | End: 2021-08-27

## 2021-04-29 RX ORDER — CIMETIDINE 400 MG/1
400 TABLET, FILM COATED ORAL 2 TIMES DAILY
COMMUNITY
Start: 2021-02-16

## 2021-04-29 NOTE — PROGRESS NOTES
Assessment/Plan:    1  Obesity, Class III, BMI 40-49 9 (morbid obesity) ([facility])  Comments:  trial of phentermine, consider saxenda or ozempic  Orders:  -     phentermine (ADIPEX-P) 37 5 MG tablet; Take 1 tablet (37 5 mg total) by mouth daily as needed (appetite control)    2  Mild intermittent asthma without complication  Comments:  well controlled with allergy meds        Patient Instructions   Look into ozempic, weekly injection for weight loss, or saxenda daily      Return in about 4 weeks (around 5/27/2021)  Subjective:      Patient ID: Boni Marquis is a 43 y o  female  Chief Complaint   Patient presents with    Follow-up       Here for f/u  Interested in trying phentermine  Looked into medical weight loss, but not for her  Doesn't want bariatric surgery  Has been on several courses of steroids for hives  Pt has had increased appetite  Now getting allergy shots, following with Dr Lacey Bryant, allergy, helping with asthma as well  Hasn't needed the inhaler or the neb in 2 weeks  Tolerated previous triggers  Now in the habit of snacking from the steroids  Tagamet was added for the hives as well as GI prophylaxis  Labs reviewed, A1C and lipids controlled  The following portions of the patient's history were reviewed and updated as appropriate: allergies, current medications, past family history, past medical history, past social history, past surgical history and problem list     Review of Systems   Constitutional: Negative for fatigue and fever  HENT: Negative for congestion  Eyes: Negative for visual disturbance  Respiratory: Negative for chest tightness and shortness of breath  Cardiovascular: Negative for chest pain and palpitations  Gastrointestinal: Negative for abdominal pain  Genitourinary: Negative for difficulty urinating  Musculoskeletal: Negative for arthralgias  Neurological: Negative for headaches  Hematological: Does not bruise/bleed easily           Current Outpatient Medications   Medication Sig Dispense Refill    albuterol (2 5 mg/3 mL) 0 083 % nebulizer solution Inhale      albuterol (PROVENTIL HFA,VENTOLIN HFA) 90 mcg/act inhaler Inhale 2 puffs every 6 (six) hours as needed for wheezing      Biotin 1 MG CAPS Take by mouth      Multiple Vitamins-Minerals (multivitamin with minerals) tablet Take 1 tablet by mouth daily      cimetidine (TAGAMET) 400 mg tablet Take 400 mg by mouth 2 (two) times a day      phentermine (ADIPEX-P) 37 5 MG tablet Take 1 tablet (37 5 mg total) by mouth daily as needed (appetite control) 30 tablet 0     No current facility-administered medications for this visit  Objective:    /76 (BP Location: Right arm, Patient Position: Sitting, Cuff Size: Large)   Pulse 58   Temp 97 7 °F (36 5 °C) (Temporal)   Resp 16   Ht 5' 2" (1 575 m)   Wt 108 kg (239 lb)   SpO2 99%   BMI 43 71 kg/m²        Physical Exam  Vitals signs reviewed  Constitutional:       Appearance: She is well-developed  She is obese  Cardiovascular:      Rate and Rhythm: Normal rate and regular rhythm  Heart sounds: Normal heart sounds  Pulmonary:      Effort: Pulmonary effort is normal       Breath sounds: Normal breath sounds  Skin:     General: Skin is warm  Neurological:      Mental Status: She is alert and oriented to person, place, and time  Psychiatric:         Mood and Affect: Mood normal          Behavior: Behavior normal          Thought Content:  Thought content normal          Judgment: Judgment normal                 Rosa Perez MD

## 2021-05-02 ENCOUNTER — IMMUNIZATIONS (OUTPATIENT)
Dept: FAMILY MEDICINE CLINIC | Facility: HOSPITAL | Age: 36
End: 2021-05-02

## 2021-05-02 DIAGNOSIS — Z23 ENCOUNTER FOR IMMUNIZATION: Primary | ICD-10-CM

## 2021-05-02 PROCEDURE — 91300 SARS-COV-2 / COVID-19 MRNA VACCINE (PFIZER-BIONTECH) 30 MCG: CPT

## 2021-05-02 PROCEDURE — 0002A SARS-COV-2 / COVID-19 MRNA VACCINE (PFIZER-BIONTECH) 30 MCG: CPT

## 2021-05-04 ENCOUNTER — OFFICE VISIT (OUTPATIENT)
Dept: URGENT CARE | Facility: MEDICAL CENTER | Age: 36
End: 2021-05-04
Payer: COMMERCIAL

## 2021-05-04 VITALS
RESPIRATION RATE: 18 BRPM | HEART RATE: 67 BPM | SYSTOLIC BLOOD PRESSURE: 117 MMHG | DIASTOLIC BLOOD PRESSURE: 71 MMHG | OXYGEN SATURATION: 99 % | TEMPERATURE: 98.2 F

## 2021-05-04 DIAGNOSIS — T50.B95A ADVERSE EFFECT OF COVID-19 VACCINE: Primary | ICD-10-CM

## 2021-05-04 PROCEDURE — G0382 LEV 3 HOSP TYPE B ED VISIT: HCPCS | Performed by: PHYSICIAN ASSISTANT

## 2021-05-04 NOTE — PROGRESS NOTES
[facility]        NAME: Catie Hoffman is a 43 y o  female  : 1985    MRN: [id number]  DATE: May 4, 2021  TIME: 11:00 AM    Assessment and Plan   Adverse effect of COVID-19 vaccine [T50  B95A]  1  Adverse effect of COVID-19 vaccine           Patient Instructions     Tylenol or Motrin for pain or fever   drink fluids and get plenty of rest  Follow up with PCP in 3-5 days  Proceed to  ER if symptoms worsen  Chief Complaint     Chief Complaint   Patient presents with    Fever     pt c/o fevers and body aches after second covid shot         History of Present Illness        Patient is a 26-year-old female who presents today with complaints of fever, chills, body aches, nausea and vomiting starting 2021  She did get her 2nd dose of the Pfizer COVID-19 vaccine that same day  Fever has been as high as 102  Patient feels ill today at work and continues to have fever  Review of Systems   Review of Systems   Constitutional: Positive for chills, fatigue and fever  HENT: Negative for congestion, ear pain and sore throat  Respiratory: Negative for cough and shortness of breath  Cardiovascular: Negative for chest pain  Gastrointestinal: Positive for nausea and vomiting  Negative for abdominal pain  Musculoskeletal: Positive for myalgias           Current Medications       Current Outpatient Medications:     albuterol (2 5 mg/3 mL) 0 083 % nebulizer solution, Inhale, Disp: , Rfl:     albuterol (PROVENTIL HFA,VENTOLIN HFA) 90 mcg/act inhaler, Inhale 2 puffs every 6 (six) hours as needed for wheezing, Disp: , Rfl:     Biotin 1 MG CAPS, Take by mouth, Disp: , Rfl:     cimetidine (TAGAMET) 400 mg tablet, Take 400 mg by mouth 2 (two) times a day, Disp: , Rfl:     Multiple Vitamins-Minerals (multivitamin with minerals) tablet, Take 1 tablet by mouth daily, Disp: , Rfl:     phentermine (ADIPEX-P) 37 5 MG tablet, Take 1 tablet (37 5 mg total) by mouth daily as needed (appetite control), Disp: 30 tablet, Rfl: 0    Current Allergies     Allergies as of 05/04/2021 - Reviewed 05/04/2021   Allergen Reaction Noted    Lac bovis Angioedema 02/01/2019    Shrimp extract allergy skin test - food allergy Angioedema 02/01/2019    Banana - food allergy Other (See Comments) 03/22/2014    Fruit extracts  12/17/2013    Kiwi extract - food allergy Other (See Comments) 03/22/2014    Latex Other (See Comments) 03/22/2014    Pineapple - food allergy Hives 03/22/2014    Pollen extract  06/13/2014            The following portions of the patient's history were reviewed and updated as appropriate: allergies, current medications, past family history, past medical history, past social history, past surgical history and problem list      Past Medical History:   Diagnosis Date    Allergic     Asthma     GERD (gastroesophageal reflux disease)     Morbid obesity with BMI of 40 0-44 9, adult ([facility] [address] )        Past Surgical History:   Procedure Laterality Date    CHOLECYSTECTOMY      TONSILECTOMY AND ADNOIDECTOMY      TUBAL LIGATION         Family History   Problem Relation Age of Onset    Diabetes Mother     Hypertension Mother     Hypertension Father     Cancer Father         melanoma     Seizures Sister     Diabetes Maternal Grandmother     Hypertension Maternal Grandmother     Cancer Maternal Grandmother     Heart disease Maternal Grandmother     Thyroid disease Neg Hx     Stroke Neg Hx          Medications have been verified  Objective   /71 (BP Location: Right arm)   Pulse 67   Temp 98 2 °F (36 8 °C)   Resp 18   SpO2 99%        Physical Exam     Physical Exam  Constitutional:       General: She is not in acute distress  Appearance: Normal appearance  She is ill-appearing  HENT:      Head: Normocephalic and atraumatic        Right Ear: Tympanic membrane and ear canal normal       Left Ear: Tympanic membrane and ear canal normal       Nose: Nose normal       Mouth/Throat:      Mouth: Mucous membranes are moist       Pharynx: Oropharynx is clear  Cardiovascular:      Rate and Rhythm: Normal rate and regular rhythm  Pulmonary:      Effort: Pulmonary effort is normal       Breath sounds: Normal breath sounds  Abdominal:      General: Bowel sounds are normal       Palpations: Abdomen is soft  Skin:     General: Skin is warm and dry  Neurological:      Mental Status: She is alert

## 2021-05-04 NOTE — LETTER
May 4, 2021     Patient: Mir Redmond   YOB: 1985   Date of Visit: 5/4/2021       To Whom it May Concern:    Mir Redmond was seen in my clinic on 5/4/2021  Please excuse from work today  If you have any questions or concerns, please don't hesitate to call           Sincerely,          Salomon Cordova PA-C        CC: Mir Redmond

## 2021-05-24 ENCOUNTER — OFFICE VISIT (OUTPATIENT)
Dept: FAMILY MEDICINE CLINIC | Facility: CLINIC | Age: 36
End: 2021-05-24
Payer: COMMERCIAL

## 2021-05-24 VITALS
DIASTOLIC BLOOD PRESSURE: 68 MMHG | HEART RATE: 58 BPM | BODY MASS INDEX: 43.17 KG/M2 | HEIGHT: 62 IN | TEMPERATURE: 97.8 F | SYSTOLIC BLOOD PRESSURE: 110 MMHG | WEIGHT: 234.6 LBS | OXYGEN SATURATION: 97 %

## 2021-05-24 DIAGNOSIS — E66.01 OBESITY, CLASS III, BMI 40-49.9 (MORBID OBESITY) (HCC): ICD-10-CM

## 2021-05-24 DIAGNOSIS — R11.2 NON-INTRACTABLE VOMITING WITH NAUSEA, UNSPECIFIED VOMITING TYPE: Primary | ICD-10-CM

## 2021-05-24 PROCEDURE — U0003 INFECTIOUS AGENT DETECTION BY NUCLEIC ACID (DNA OR RNA); SEVERE ACUTE RESPIRATORY SYNDROME CORONAVIRUS 2 (SARS-COV-2) (CORONAVIRUS DISEASE [COVID-19]), AMPLIFIED PROBE TECHNIQUE, MAKING USE OF HIGH THROUGHPUT TECHNOLOGIES AS DESCRIBED BY CMS-2020-01-R: HCPCS | Performed by: FAMILY MEDICINE

## 2021-05-24 PROCEDURE — 99214 OFFICE O/P EST MOD 30 MIN: CPT | Performed by: FAMILY MEDICINE

## 2021-05-24 PROCEDURE — U0005 INFEC AGEN DETEC AMPLI PROBE: HCPCS | Performed by: FAMILY MEDICINE

## 2021-05-24 RX ORDER — ONDANSETRON 4 MG/1
4 TABLET, ORALLY DISINTEGRATING ORAL EVERY 6 HOURS PRN
Qty: 20 TABLET | Refills: 0 | Status: SHIPPED | OUTPATIENT
Start: 2021-05-24 | End: 2022-06-01

## 2021-05-24 NOTE — PROGRESS NOTES
Assessment/Plan:    1  Non-intractable vomiting with nausea, unspecified vomiting type  -     ondansetron (ZOFRAN-ODT) 4 mg disintegrating tablet; Take 1 tablet (4 mg total) by mouth every 6 (six) hours as needed for nausea or vomiting  -     Novel Coronavirus (Covid-19),PCR [facility] - Collected in Office    2  Obesity, Class III, BMI 40-49 9 (morbid obesity) ([facility] [address] )  Comments:  trial of saxenda, check insurance coverage, wait until GI illness clears, perhaps up to 2 weeks  Orders:  -     liraglutide (SAXENDA) injection; Inject 0 1 mL (0 6 mg total) under the skin daily        There are no Patient Instructions on file for this visit  Return in about 3 months (around 8/24/2021)  Subjective:      Patient ID: Oriana Pantoja is a 43 y o  female  Chief Complaint   Patient presents with    Follow-up       C/o nausea, chills, and vomiting today, 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, 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  She is fully vaccinated  Phentermine not helping with weight loss, though she tolerated it well  No fevers  Pt is fully vaccinated  The following portions of the patient's history were reviewed and updated as appropriate: allergies, current medications, past family history, past medical history, past social history, past surgical history and problem list     Review of Systems   Constitutional: Negative for fatigue and fever  HENT: Negative for congestion  Eyes: Negative for visual disturbance  Respiratory: Negative for chest tightness and shortness of breath  Cardiovascular: Negative for chest pain and palpitations  Gastrointestinal: Positive for nausea and vomiting  Negative for abdominal pain  Genitourinary: Negative for difficulty urinating  Musculoskeletal: Negative for arthralgias  Neurological: Negative for headaches  Hematological: Does not bruise/bleed easily           Current Outpatient Medications   Medication Sig Dispense Refill    albuterol (2 5 mg/3 mL) 0 083 % nebulizer solution Inhale      albuterol (PROVENTIL HFA,VENTOLIN HFA) 90 mcg/act inhaler Inhale 2 puffs every 6 (six) hours as needed for wheezing      Biotin 1 MG CAPS Take by mouth      cimetidine (TAGAMET) 400 mg tablet Take 400 mg by mouth 2 (two) times a day      Multiple Vitamins-Minerals (multivitamin with minerals) tablet Take 1 tablet by mouth daily      phentermine (ADIPEX-P) 37 5 MG tablet Take 1 tablet (37 5 mg total) by mouth daily as needed (appetite control) 30 tablet 0    liraglutide (SAXENDA) injection Inject 0 1 mL (0 6 mg total) under the skin daily 3 mL 2    ondansetron (ZOFRAN-ODT) 4 mg disintegrating tablet Take 1 tablet (4 mg total) by mouth every 6 (six) hours as needed for nausea or vomiting 20 tablet 0     No current facility-administered medications for this visit  Objective:    /68 (BP Location: Right arm, Patient Position: Sitting, Cuff Size: Standard)   Pulse 58   Temp 97 8 °F (36 6 °C)   Ht 5' 2" (1 575 m)   Wt 106 kg (234 lb 9 6 oz)   LMP 2021   SpO2 97%   BMI 42 91 kg/m²        Physical Exam  Vitals signs reviewed  Constitutional:       Appearance: She is well-developed  Cardiovascular:      Rate and Rhythm: Normal rate and regular rhythm  Heart sounds: Normal heart sounds  Pulmonary:      Effort: Pulmonary effort is normal       Breath sounds: Normal breath sounds  Skin:     General: Skin is warm  Neurological:      Mental Status: She is alert and oriented to person, place, and time  Psychiatric:         Behavior: Behavior normal          Thought Content:  Thought content normal          Judgment: Judgment normal                 Issac Vogel MD

## 2021-05-24 NOTE — LETTER
May 24, 2021     Patient: Alice Sharma   YOB: 1985   Date of Visit: 5/24/2021       To Whom it May Concern:    Alice Sharma is under my professional care  She was seen in my office on 5/24/2021  She may return to work on 5/26/21  If you have any questions or concerns, please don't hesitate to call           Sincerely,          Isai Kimbrough MD        CC: No Recipients

## 2021-05-25 LAB — SARS-COV-2 RNA RESP QL NAA+PROBE: NEGATIVE

## 2021-05-27 ENCOUNTER — TELEMEDICINE (OUTPATIENT)
Dept: FAMILY MEDICINE CLINIC | Facility: CLINIC | Age: 36
End: 2021-05-27
Payer: COMMERCIAL

## 2021-05-27 ENCOUNTER — APPOINTMENT (OUTPATIENT)
Dept: LAB | Facility: CLINIC | Age: 36
End: 2021-05-27
Payer: COMMERCIAL

## 2021-05-27 DIAGNOSIS — R10.11 COLICKY RUQ ABDOMINAL PAIN: ICD-10-CM

## 2021-05-27 DIAGNOSIS — R11.2 NON-INTRACTABLE VOMITING WITH NAUSEA, UNSPECIFIED VOMITING TYPE: ICD-10-CM

## 2021-05-27 DIAGNOSIS — R11.2 NON-INTRACTABLE VOMITING WITH NAUSEA, UNSPECIFIED VOMITING TYPE: Primary | ICD-10-CM

## 2021-05-27 LAB
ALBUMIN SERPL BCP-MCNC: 3.8 G/DL (ref 3.5–5)
ALP SERPL-CCNC: 61 U/L (ref 46–116)
ALT SERPL W P-5'-P-CCNC: 20 U/L (ref 12–78)
ANION GAP SERPL CALCULATED.3IONS-SCNC: 6 MMOL/L (ref 4–13)
AST SERPL W P-5'-P-CCNC: 11 U/L (ref 5–45)
BASOPHILS # BLD AUTO: 0.03 THOUSANDS/ΜL (ref 0–0.1)
BASOPHILS NFR BLD AUTO: 0 % (ref 0–1)
BILIRUB SERPL-MCNC: 0.58 MG/DL (ref 0.2–1)
BUN SERPL-MCNC: 7 MG/DL (ref 5–25)
CALCIUM SERPL-MCNC: 9.4 MG/DL (ref 8.3–10.1)
CHLORIDE SERPL-SCNC: 107 MMOL/L (ref 100–108)
CO2 SERPL-SCNC: 30 MMOL/L (ref 21–32)
CREAT SERPL-MCNC: 0.99 MG/DL (ref 0.6–1.3)
EOSINOPHIL # BLD AUTO: 0.11 THOUSAND/ΜL (ref 0–0.61)
EOSINOPHIL NFR BLD AUTO: 2 % (ref 0–6)
ERYTHROCYTE [DISTWIDTH] IN BLOOD BY AUTOMATED COUNT: 13.3 % (ref 11.6–15.1)
GFR SERPL CREATININE-BSD FRML MDRD: 84 ML/MIN/1.73SQ M
GGT SERPL-CCNC: 35 U/L (ref 5–85)
GLUCOSE P FAST SERPL-MCNC: 89 MG/DL (ref 65–99)
HCT VFR BLD AUTO: 41 % (ref 34.8–46.1)
HGB BLD-MCNC: 13 G/DL (ref 11.5–15.4)
IMM GRANULOCYTES # BLD AUTO: 0.02 THOUSAND/UL (ref 0–0.2)
IMM GRANULOCYTES NFR BLD AUTO: 0 % (ref 0–2)
LIPASE SERPL-CCNC: 70 U/L (ref 73–393)
LYMPHOCYTES # BLD AUTO: 2.07 THOUSANDS/ΜL (ref 0.6–4.47)
LYMPHOCYTES NFR BLD AUTO: 28 % (ref 14–44)
MCH RBC QN AUTO: 25.6 PG (ref 26.8–34.3)
MCHC RBC AUTO-ENTMCNC: 31.7 G/DL (ref 31.4–37.4)
MCV RBC AUTO: 81 FL (ref 82–98)
MONOCYTES # BLD AUTO: 0.86 THOUSAND/ΜL (ref 0.17–1.22)
MONOCYTES NFR BLD AUTO: 12 % (ref 4–12)
NEUTROPHILS # BLD AUTO: 4.23 THOUSANDS/ΜL (ref 1.85–7.62)
NEUTS SEG NFR BLD AUTO: 58 % (ref 43–75)
NRBC BLD AUTO-RTO: 0 /100 WBCS
PLATELET # BLD AUTO: 229 THOUSANDS/UL (ref 149–390)
PMV BLD AUTO: 11.6 FL (ref 8.9–12.7)
POTASSIUM SERPL-SCNC: 4.3 MMOL/L (ref 3.5–5.3)
PROT SERPL-MCNC: 7.7 G/DL (ref 6.4–8.2)
RBC # BLD AUTO: 5.07 MILLION/UL (ref 3.81–5.12)
SODIUM SERPL-SCNC: 143 MMOL/L (ref 136–145)
WBC # BLD AUTO: 7.32 THOUSAND/UL (ref 4.31–10.16)

## 2021-05-27 PROCEDURE — 80053 COMPREHEN METABOLIC PANEL: CPT

## 2021-05-27 PROCEDURE — 99214 OFFICE O/P EST MOD 30 MIN: CPT | Performed by: FAMILY MEDICINE

## 2021-05-27 PROCEDURE — 83690 ASSAY OF LIPASE: CPT

## 2021-05-27 PROCEDURE — 82977 ASSAY OF GGT: CPT

## 2021-05-27 PROCEDURE — 85025 COMPLETE CBC W/AUTO DIFF WBC: CPT

## 2021-05-27 PROCEDURE — 36415 COLL VENOUS BLD VENIPUNCTURE: CPT

## 2021-05-27 NOTE — PROGRESS NOTES
Virtual Regular Visit      Assessment/Plan:    Problem List Items Addressed This Visit     None      Visit Diagnoses     Non-intractable vomiting with nausea, unspecified vomiting type    -  Primary    Relevant Orders    CBC and differential (Completed)    Comprehensive metabolic panel (Completed)    Lipase (Completed)    Gamma GT    US abdomen complete    Colicky RUQ abdominal pain        consider biliary duct inflammation, pancreatitis, hepatitis, gastric ulcer, pt sent for stat US and labs    Relevant Orders    CBC and differential (Completed)    Comprehensive metabolic panel (Completed)    Lipase (Completed)    Gamma GT    US abdomen complete          BMI Counseling: There is no height or weight on file to calculate BMI  The BMI is above normal  Nutrition recommendations include decreasing portion sizes, encouraging healthy choices of fruits and vegetables, consuming healthier snacks and moderation in carbohydrate intake  No pharmacotherapy was ordered  Patient referred to PCP due to patient being overweight  Pt trying to get weight loss meds approved          Reason for visit is   Chief Complaint   Patient presents with    Virtual Regular Visit        Encounter provider Jose Kerr MD    Provider located at [address] [place] [id number]      Recent Visits  Date Type Provider Dept   05/24/21 Office Visit MD Clarence Márquez   Showing recent visits within past 7 days and meeting all other requirements     Today's Visits  Date Type Provider Dept   05/27/21 Telemedicine MD Clarence Márquez   Showing today's visits and meeting all other requirements     Future Appointments  No visits were found meeting these conditions  Showing future appointments within next 150 days and meeting all other requirements        The patient was identified by name and date of birth   Ghassan Spaulding was informed that this is a telemedicine visit and that the visit is being conducted through [address] [place] [address] [facility] [employer] and patient was informed that this is a secure, HIPAA-compliant platform  She agrees to proceed     My office door was closed  No one else was in the room  She acknowledged consent and understanding of privacy and security of the video platform  The patient has agreed to participate and understands they can discontinue the visit at any time  Patient is aware this is a billable service  Subjective  Malia Mejia is a 43 y o  female abd pain   Pt c/o vomiting x 4 days now, no improvement with zofran rx'd on 5/24  She can only tolerate some fluids, has not eaten  No BM, no fevers  She reports h/o raman with use of biliary stents during her pregnancy 7 years ago  She reports RUQ pain that feels like her GB pain  She has pain in the R side of her back that sometimes causes vomiting as well  She declines going to ED bc of the $100 copay if she is not admitted  She also notes a h/o gastric ulcer a few years ago without rupture         Past Medical History:   Diagnosis Date    Allergic     Asthma     GERD (gastroesophageal reflux disease)     Morbid obesity with BMI of 40 0-44 9, adult (HCC)        Past Surgical History:   Procedure Laterality Date    CHOLECYSTECTOMY      TONSILECTOMY AND ADNOIDECTOMY      TUBAL LIGATION         Current Outpatient Medications   Medication Sig Dispense Refill    albuterol (2 5 mg/3 mL) 0 083 % nebulizer solution Inhale      albuterol (PROVENTIL HFA,VENTOLIN HFA) 90 mcg/act inhaler Inhale 2 puffs every 6 (six) hours as needed for wheezing      Biotin 1 MG CAPS Take by mouth      cimetidine (TAGAMET) 400 mg tablet Take 400 mg by mouth 2 (two) times a day      liraglutide (SAXENDA) injection Inject 0 1 mL (0 6 mg total) under the skin daily 3 mL 2    Multiple Vitamins-Minerals (multivitamin with minerals) tablet Take 1 tablet by mouth daily      ondansetron (ZOFRAN-ODT) 4 mg disintegrating tablet Take 1 tablet (4 mg total) by mouth every 6 (six) hours as needed for nausea or vomiting 20 tablet 0    phentermine (ADIPEX-P) 37 5 MG tablet Take 1 tablet (37 5 mg total) by mouth daily as needed (appetite control) 30 tablet 0     No current facility-administered medications for this visit  Allergies   Allergen Reactions    Lac Bovis Angioedema    Shrimp Extract Allergy Skin Test - Food Allergy Angioedema    Banana - Food Allergy Other (See Comments)     ulcerations    Fruit Extracts      Annotation - [id number]: bananas, kiwi, pineapples    Kiwi Extract - Food Allergy Other (See Comments)     tongue swelling    Latex Other (See Comments)     pt warned to avoid due to food allergies    Pineapple - Food Allergy Hives    Pollen Extract        Review of Systems   Constitutional: Negative for fatigue and fever  HENT: Negative for congestion  Eyes: Negative for visual disturbance  Respiratory: Negative for chest tightness and shortness of breath  Cardiovascular: Negative for chest pain and palpitations  Gastrointestinal: Positive for abdominal pain, nausea and vomiting  Genitourinary: Negative for difficulty urinating  Musculoskeletal: Positive for back pain  Negative for arthralgias  Skin: Negative for color change  Neurological: Negative for headaches  Hematological: Does not bruise/bleed easily  Video Exam    There were no vitals filed for this visit  Physical Exam  Constitutional:       Appearance: She is well-developed  She is obese  She is ill-appearing  Neck:      Musculoskeletal: Normal range of motion and neck supple  Pulmonary:      Effort: Pulmonary effort is normal    Skin:     Coloration: Skin is not pale  Neurological:      Mental Status: She is alert and oriented to person, place, and time  Psychiatric:         Mood and Affect: Mood normal          Behavior: Behavior normal          Thought Content:  Thought content normal  Judgment: Judgment normal           I spent 10 minutes directly with the patient during this visit      VIRTUAL VISIT DISCLAIMER    Del Palacios acknowledges that she has consented to an online visit or consultation  She understands that the online visit is based solely on information provided by her, and that, in the absence of a face-to-face physical evaluation by the physician, the diagnosis she receives is both limited and provisional in terms of accuracy and completeness  This is not intended to replace a full medical face-to-face evaluation by the physician  Del Palacios understands and accepts these terms

## 2021-05-28 ENCOUNTER — TRANSCRIBE ORDERS (OUTPATIENT)
Dept: ADMINISTRATIVE | Age: 36
End: 2021-05-28

## 2021-05-28 ENCOUNTER — HOSPITAL ENCOUNTER (OUTPATIENT)
Dept: RADIOLOGY | Age: 36
Discharge: HOME | End: 2021-05-28
Payer: COMMERCIAL

## 2021-05-28 DIAGNOSIS — R10.11 COLICKY RUQ ABDOMINAL PAIN: ICD-10-CM

## 2021-05-28 DIAGNOSIS — R11.2 NON-INTRACTABLE VOMITING WITH NAUSEA, UNSPECIFIED VOMITING TYPE: ICD-10-CM

## 2021-05-28 PROCEDURE — 76700 US EXAM ABDOM COMPLETE: CPT

## 2021-08-05 ENCOUNTER — TELEPHONE (OUTPATIENT)
Dept: OBGYN CLINIC | Facility: CLINIC | Age: 36
End: 2021-08-05

## 2021-08-05 NOTE — TELEPHONE ENCOUNTER
Spoke with pt who cancelled apt today at 11:20 with kk as states she got her cycle although just  Starting and  Has  pink disch   Too late to keep apt as space filled  Pt is a  employee working in [employer] she has had yellow disch and fishy odor for the past month  Offered to send message for possible rx or resched ap, but pt refused    States she will walk into [facility] tomorrow at the end of the day for apt

## 2021-08-05 NOTE — TELEPHONE ENCOUNTER
Pt has called in to cancel appt 08/05 as she has gotten her period  She was coming in for symptoms such as smelly odor, heavy yellow discharge, burning, itching  Can she have something sent out to her until her next appt to be scheduled when her period is gone  Please call pt to confirm   [phone]

## 2021-08-25 ENCOUNTER — APPOINTMENT (OUTPATIENT)
Dept: LAB | Facility: MEDICAL CENTER | Age: 36
End: 2021-08-25
Payer: COMMERCIAL

## 2021-08-25 ENCOUNTER — TELEPHONE (OUTPATIENT)
Dept: FAMILY MEDICINE CLINIC | Facility: CLINIC | Age: 36
End: 2021-08-25

## 2021-08-25 DIAGNOSIS — J02.9 PHARYNGITIS, UNSPECIFIED ETIOLOGY: Primary | ICD-10-CM

## 2021-08-25 DIAGNOSIS — R42 DIZZINESS: ICD-10-CM

## 2021-08-25 DIAGNOSIS — J02.9 PHARYNGITIS, UNSPECIFIED ETIOLOGY: ICD-10-CM

## 2021-08-25 LAB
ALBUMIN SERPL BCP-MCNC: 3.5 G/DL (ref 3.5–5)
ALP SERPL-CCNC: 66 U/L (ref 46–116)
ALT SERPL W P-5'-P-CCNC: 17 U/L (ref 12–78)
ANION GAP SERPL CALCULATED.3IONS-SCNC: 2 MMOL/L (ref 4–13)
AST SERPL W P-5'-P-CCNC: 10 U/L (ref 5–45)
BASOPHILS # BLD AUTO: 0.04 THOUSANDS/ΜL (ref 0–0.1)
BASOPHILS NFR BLD AUTO: 1 % (ref 0–1)
BILIRUB SERPL-MCNC: 0.62 MG/DL (ref 0.2–1)
BUN SERPL-MCNC: 13 MG/DL (ref 5–25)
CALCIUM SERPL-MCNC: 8.8 MG/DL (ref 8.3–10.1)
CHLORIDE SERPL-SCNC: 108 MMOL/L (ref 100–108)
CO2 SERPL-SCNC: 27 MMOL/L (ref 21–32)
CREAT SERPL-MCNC: 0.85 MG/DL (ref 0.6–1.3)
EOSINOPHIL # BLD AUTO: 0.18 THOUSAND/ΜL (ref 0–0.61)
EOSINOPHIL NFR BLD AUTO: 2 % (ref 0–6)
ERYTHROCYTE [DISTWIDTH] IN BLOOD BY AUTOMATED COUNT: 14 % (ref 11.6–15.1)
ERYTHROCYTE [SEDIMENTATION RATE] IN BLOOD: 52 MM/HOUR (ref 0–19)
FERRITIN SERPL-MCNC: 34 NG/ML (ref 8–388)
GFR SERPL CREATININE-BSD FRML MDRD: 101 ML/MIN/1.73SQ M
GLUCOSE SERPL-MCNC: 88 MG/DL (ref 65–140)
HCT VFR BLD AUTO: 40.3 % (ref 34.8–46.1)
HGB BLD-MCNC: 12.5 G/DL (ref 11.5–15.4)
IMM GRANULOCYTES # BLD AUTO: 0.02 THOUSAND/UL (ref 0–0.2)
IMM GRANULOCYTES NFR BLD AUTO: 0 % (ref 0–2)
LYMPHOCYTES # BLD AUTO: 1.76 THOUSANDS/ΜL (ref 0.6–4.47)
LYMPHOCYTES NFR BLD AUTO: 22 % (ref 14–44)
MCH RBC QN AUTO: 25.8 PG (ref 26.8–34.3)
MCHC RBC AUTO-ENTMCNC: 31 G/DL (ref 31.4–37.4)
MCV RBC AUTO: 83 FL (ref 82–98)
MONOCYTES # BLD AUTO: 0.75 THOUSAND/ΜL (ref 0.17–1.22)
MONOCYTES NFR BLD AUTO: 10 % (ref 4–12)
NEUTROPHILS # BLD AUTO: 5.09 THOUSANDS/ΜL (ref 1.85–7.62)
NEUTS SEG NFR BLD AUTO: 65 % (ref 43–75)
NRBC BLD AUTO-RTO: 0 /100 WBCS
PLATELET # BLD AUTO: 244 THOUSANDS/UL (ref 149–390)
PMV BLD AUTO: 12.5 FL (ref 8.9–12.7)
POTASSIUM SERPL-SCNC: 4.2 MMOL/L (ref 3.5–5.3)
PROT SERPL-MCNC: 7.6 G/DL (ref 6.4–8.2)
RBC # BLD AUTO: 4.84 MILLION/UL (ref 3.81–5.12)
SODIUM SERPL-SCNC: 137 MMOL/L (ref 136–145)
TSH SERPL DL<=0.05 MIU/L-ACNC: 0.99 UIU/ML (ref 0.36–3.74)
WBC # BLD AUTO: 7.84 THOUSAND/UL (ref 4.31–10.16)

## 2021-08-25 PROCEDURE — 84443 ASSAY THYROID STIM HORMONE: CPT

## 2021-08-25 PROCEDURE — 82728 ASSAY OF FERRITIN: CPT

## 2021-08-25 PROCEDURE — 36415 COLL VENOUS BLD VENIPUNCTURE: CPT

## 2021-08-25 PROCEDURE — 85652 RBC SED RATE AUTOMATED: CPT

## 2021-08-25 PROCEDURE — 85025 COMPLETE CBC W/AUTO DIFF WBC: CPT

## 2021-08-25 PROCEDURE — 86618 LYME DISEASE ANTIBODY: CPT

## 2021-08-25 PROCEDURE — 80053 COMPREHEN METABOLIC PANEL: CPT

## 2021-08-25 NOTE — TELEPHONE ENCOUNTER
Patient came in asking for labs to be done- Routine and anyothers? 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

## 2021-08-26 LAB — B BURGDOR IGG+IGM SER-ACNC: 87

## 2021-08-27 ENCOUNTER — OFFICE VISIT (OUTPATIENT)
Dept: FAMILY MEDICINE CLINIC | Facility: CLINIC | Age: 36
End: 2021-08-27
Payer: COMMERCIAL

## 2021-08-27 VITALS
BODY MASS INDEX: 41.22 KG/M2 | SYSTOLIC BLOOD PRESSURE: 120 MMHG | DIASTOLIC BLOOD PRESSURE: 70 MMHG | HEART RATE: 72 BPM | TEMPERATURE: 97.6 F | OXYGEN SATURATION: 99 % | HEIGHT: 62 IN | RESPIRATION RATE: 18 BRPM | WEIGHT: 224 LBS

## 2021-08-27 DIAGNOSIS — R42 VERTIGO: ICD-10-CM

## 2021-08-27 DIAGNOSIS — E66.01 OBESITY, CLASS III, BMI 40-49.9 (MORBID OBESITY) (HCC): ICD-10-CM

## 2021-08-27 DIAGNOSIS — B37.0 THRUSH: Primary | ICD-10-CM

## 2021-08-27 PROCEDURE — 99214 OFFICE O/P EST MOD 30 MIN: CPT | Performed by: PHYSICIAN ASSISTANT

## 2021-08-27 RX ORDER — FLUCONAZOLE 150 MG/1
150 TABLET ORAL ONCE
Qty: 1 TABLET | Refills: 0 | Status: SHIPPED | OUTPATIENT
Start: 2021-08-27 | End: 2021-08-27

## 2021-08-27 RX ORDER — FLUTICASONE PROPIONATE AND SALMETEROL XINAFOATE 115; 21 UG/1; UG/1
AEROSOL, METERED RESPIRATORY (INHALATION)
COMMUNITY
Start: 2021-08-24

## 2021-08-27 NOTE — PROGRESS NOTES
Assessment/Plan:     Diagnoses and all orders for this visit:    Thrush  -     nystatin (MYCOSTATIN) 500,000 units/5 mL suspension; Apply 5 mL (500,000 Units total) to the mouth or throat 4 (four) times a day  -     fluconazole (DIFLUCAN) 150 mg tablet; Take 1 tablet (150 mg total) by mouth once for 1 dose    Vertigo  -     Ambulatory referral to Physical Therapy; Future    Other orders  -     Advair -21 MCG/ACT inhaler          Subjective:      Patient ID: Macey Tracy is a 43 y o  female  Patient presents in the office with 2 problems  First of all she said she has been dizzy for months  This is related to position and movement  No nausea or vomiting  Patient states she also has had a sore throat for over week  She states her throat is very dry sometimes she has voice change  He has a swollen gland on the left side  Earache on the left  Nasal congestion  Patient has a history of asthma she is on Proventil HFA as needed and Advair regularly        The following portions of the patient's history were reviewed and updated as appropriate:   She   Patient Active Problem List    Diagnosis Date Noted    Obesity, Class III, BMI 40-49 9 (morbid obesity) ([facility] [address] ) 11/12/2020    Multiple food allergies 10/24/2018    Vitamin D deficiency 10/24/2018    Mild intermittent asthma without complication 69/25/4094    Allergic rhinitis 04/03/2015    Migraine headache 04/03/2015    Osteochondroma 04/03/2015     Current Outpatient Medications   Medication Sig Dispense Refill    Advair -21 MCG/ACT inhaler       albuterol (2 5 mg/3 mL) 0 083 % nebulizer solution Inhale      albuterol (PROVENTIL HFA,VENTOLIN HFA) 90 mcg/act inhaler Inhale 2 puffs every 6 (six) hours as needed for wheezing      Biotin 1 MG CAPS Take by mouth      cimetidine (TAGAMET) 400 mg tablet Take 400 mg by mouth 2 (two) times a day      Multiple Vitamins-Minerals (multivitamin with minerals) tablet Take 1 tablet by mouth daily  ondansetron (ZOFRAN-ODT) 4 mg disintegrating tablet Take 1 tablet (4 mg total) by mouth every 6 (six) hours as needed for nausea or vomiting 20 tablet 0    fluconazole (DIFLUCAN) 150 mg tablet Take 1 tablet (150 mg total) by mouth once for 1 dose 1 tablet 0    nystatin (MYCOSTATIN) 500,000 units/5 mL suspension Apply 5 mL (500,000 Units total) to the mouth or throat 4 (four) times a day 60 mL 1     No current facility-administered medications for this visit  She is allergic to lac bovis, shrimp extract allergy skin test - food allergy, banana - food allergy, fruit extracts, kiwi extract - food allergy, latex, pineapple - food allergy, and pollen extract       Review of Systems   Constitutional: Negative for activity change, appetite change, chills, fatigue and fever  HENT: Positive for congestion, ear pain, sore throat and voice change  Negative for postnasal drip, rhinorrhea, sinus pressure, sinus pain and trouble swallowing  Respiratory: Negative for cough, shortness of breath and wheezing  Cardiovascular: Negative for chest pain  Neurological: Positive for dizziness  Objective:        Physical Exam  Vitals and nursing note reviewed  Constitutional:       Appearance: She is well-developed  She is obese  HENT:      Head: Normocephalic and atraumatic  Right Ear: Tympanic membrane, ear canal and external ear normal       Left Ear: Tympanic membrane, ear canal and external ear normal       Nose: No congestion or rhinorrhea  Mouth/Throat:      Pharynx: No oropharyngeal exudate or posterior oropharyngeal erythema  Comments: Areas of white sports the back  Tongue appears a little bit coated white  Patient may have a slight case of thrush due to her steroid inhaler  Eyes:      Extraocular Movements: Extraocular movements intact  Conjunctiva/sclera: Conjunctivae normal       Pupils: Pupils are equal, round, and reactive to light        Comments: He has extraocular movements intact  Vertigo reproduce with looking to the right   Cardiovascular:      Heart sounds: Normal heart sounds  No murmur heard  Pulmonary:      Effort: Pulmonary effort is normal       Breath sounds: Normal breath sounds  Musculoskeletal:      Right lower leg: No edema  Left lower leg: No edema  Skin:     General: Skin is warm and dry  Neurological:      General: No focal deficit present  Mental Status: She is alert and oriented to person, place, and time  Comments: Vertigo reproduced with patient lying and turning to the right   Psychiatric:         Mood and Affect: Mood normal          Behavior: Behavior normal          Thought Content:  Thought content normal          Judgment: Judgment normal

## 2021-08-27 NOTE — TELEPHONE ENCOUNTER
Patient still taking this but ran out of meds  States it's working well  Please advise on refill        [facility]

## 2021-08-29 RX ORDER — PHENTERMINE HYDROCHLORIDE 37.5 MG/1
37.5 TABLET ORAL DAILY PRN
Qty: 30 TABLET | Refills: 0 | Status: SHIPPED | OUTPATIENT
Start: 2021-08-29 | End: 2021-11-16 | Stop reason: SDUPTHER

## 2021-09-16 ENCOUNTER — TELEPHONE (OUTPATIENT)
Dept: FAMILY MEDICINE CLINIC | Facility: CLINIC | Age: 36
End: 2021-09-16

## 2021-09-16 DIAGNOSIS — B34.9 VIRAL INFECTION, UNSPECIFIED: Primary | ICD-10-CM

## 2021-09-17 PROCEDURE — 87635 SARS-COV-2 COVID-19 AMP PRB: CPT | Performed by: FAMILY MEDICINE

## 2021-09-19 LAB — SARS-COV-2 RNA RESP QL NAA+PROBE: POSITIVE

## 2021-09-21 NOTE — TELEPHONE ENCOUNTER
Patient called with acknowledgement of COVID results  She is C/O severe fatigue - unable to complete simple tasks and eat small meals without having to lay down to rest   Denies fever but does have loss of taste/smell  Appointment for follow up on s/s scheduled 9/22/21

## 2021-09-22 ENCOUNTER — TELEMEDICINE (OUTPATIENT)
Dept: FAMILY MEDICINE CLINIC | Facility: CLINIC | Age: 36
End: 2021-09-22
Payer: COMMERCIAL

## 2021-09-22 DIAGNOSIS — U07.1 COVID-19: Primary | ICD-10-CM

## 2021-09-22 PROCEDURE — 99213 OFFICE O/P EST LOW 20 MIN: CPT | Performed by: FAMILY MEDICINE

## 2021-09-22 RX ORDER — ACETAMINOPHEN 325 MG/1
650 TABLET ORAL ONCE AS NEEDED
Status: CANCELLED | OUTPATIENT
Start: 2021-09-22

## 2021-09-22 RX ORDER — ONDANSETRON 2 MG/ML
4 INJECTION INTRAMUSCULAR; INTRAVENOUS ONCE AS NEEDED
Status: CANCELLED | OUTPATIENT
Start: 2021-09-22

## 2021-09-22 RX ORDER — SODIUM CHLORIDE 9 MG/ML
20 INJECTION, SOLUTION INTRAVENOUS ONCE
Status: CANCELLED | OUTPATIENT
Start: 2021-09-22

## 2021-09-22 RX ORDER — ALBUTEROL SULFATE 90 UG/1
3 AEROSOL, METERED RESPIRATORY (INHALATION) ONCE AS NEEDED
Status: CANCELLED | OUTPATIENT
Start: 2021-09-22

## 2021-09-22 NOTE — PROGRESS NOTES
COVID-19 Outpatient Progress Note    Assessment/Plan:    Problem List Items Addressed This Visit     None      Visit Diagnoses     COVID-19    -  Primary         Disposition:     Patient is at increased risk of progressing towards severe COVID-19 due to the following high risk criteria:   - Obesity or being overweight  - Chronic lung disease    Patient meets criteria for Bamlanivimab/Etesevimab infusion  They were counseled in regards to risks, benefits, and side effects of this infusion  Bamlanivimab and etesevimab are investigational medicines used to treat mild to moderate symptoms of COVID-19 in non-hospitalized adults and adolescents (15years of age and older who weigh at least 80 pounds (40 kg)), and who are at high risk for developing severe COVID-19 symptoms or the need for hospitalization  Bamlanivimab and etesevimab are investigational because they are still being studied  There is limited information known about the safety and effectiveness of using bamlanivimab and etesevimab to treat people with COVID-19  The FDA has authorized the emergency use of bamlanivimab and etesevimab for the treatment of COVID-19 under an Emergency Use Authorization (EUA)  How will I receive bamlanivimab and etesevimab? Bamlanivimab and etesevimab are given at the same time through a vein (intravenous or IV)    You will receive one dose of bamlanivimab and etesevimab by IV infusion  The infusion will take 21-60 minutes or longer  Possible side effects of bamlanivimab and etesevimab: Allergic reactions can happen during and after infusion with bamlanivimab and etesevimab  Possible reactions include: fever, chills, nausea, headache, shortness of breath, low or high blood pressure, rapid or slow heart rate, chest discomfort or pain, weakness, confusion, feeling tired, wheezing, swelling of your lips, face, or throat, rash including hives, itching, muscle aches, dizziness, and sweating   These reactions may be severe or life threatening  Worsening symptoms after treatment: You may experience new or worsening symptoms after infusion, including fever, difficulty breathing, rapid or slow heart rate, tiredness, weakness or confusion  If these occur, contact your healthcare provider or seek immediate medical attention as some of these events have required hospitalization  It is unknown if these events are related to treatment or are due to the progression of COVID19  The side effects of getting any medicine by vein may include brief pain, bleeding, bruising of the skin, soreness, swelling, and possible infection at the infusion site  These are not all the possible side effects of bamlanivimab and etesevimab  Not a lot of people have been given bamlanivimab and etesevimab  Serious and unexpected side effects may happen  Bamlanivimab and etesevimab are still being studied so it is possible that all of the risks are not known at this time  It is possible that bamlanivimab and etesevimab could interfere with your body's own ability to fight off a future infection of SARS-CoV-2  Similarly, bamlanivimab and etesevimab may reduce your bodys immune response to a vaccine for SARS-CoV-2  Specific studies have not been conducted to address these possible risks  Talk to your healthcare provider if you have any questions  Emergency Use Authorization:    The [facility] FDA has made bamlanivimab and etesevimab available under an emergency access mechanism called an EUA  The EUA is supported by a [place] of Health and Human Service (HHS) declaration that circumstances exist to justify the emergency use of drugs and biological products during the COVID-19 pandemic  Bamlanivimab and etesevimab have not undergone the same type of review as an FDA-approved or cleared product  The FDA may issue an EUA when certain criteria are met, which includes that there are no adequate, approved, and available alternatives   In addition, the FDA decision is based on the totality of scientific evidence available showing that it is reasonable to believe that the product meets certain criteria for safety, performance, and labeling and may be effective in treatment of patients during the COVID-19 pandemic  All of these criteria must be met to allow for the product to be used in the treatment of patients during the COVID-19 pandemic  The EUA for bamlanivimab and etesevimab together is in effect for the duration of the COVID-19 declaration justifying emergency use of these products, unless terminated or revoked (after which the product may no longer be used)  What if I am pregnant or breastfeeding? There is limited experience treating pregnant women or breastfeeding mothers with bamlanivimab and etesevimab  For a mother and unborn baby, the benefit of receiving bamlanivimab and etesevimab may be greater than the risk from the treatment  If you are pregnant or breastfeeding, discuss your options and specific situation with your healthcare provider  Regarding COVID-19 Vaccination:    Currently there is no data or safety or efficacy of COVID-19 vaccination in persons who received monoclonal antibodies as part of COVID-19 treatment  Treatment should be deferred for at least 90 days to avoid interference of the treatment with vaccine-induced immune responses (this is based on estimated half-life of therapies and evidence suggesting reinfection is uncommon within 90 days of initial infection)  Full fact sheet document for patients can be found at: [facility]    The patient consents to proceed with bamlanivimab and etesevimab infusion  I have spent 9 minutes directly with the patient  Greater than 50% of this time was spent in counseling/coordination of care regarding: prognosis, risks and benefits of treatment options, instructions for management and importance of treatment compliance  Verification of patient location:    Patient is located in the following state in which I hold an active license [place]    Encounter provider Cherise Cuello MD    Provider located at [address] [place] [address]  [phone]    Recent Visits  Date Type Provider Dept   09/16/21 Telephone Jacobsola Mccrary recent visits within past 7 days and meeting all other requirements  Today's Visits  Date Type Provider Dept   09/22/21 Telemedicine MD Clarence Ibrahim   Showing today's visits and meeting all other requirements  Future Appointments  No visits were found meeting these conditions  Showing future appointments within next 150 days and meeting all other requirements     This virtual check-in was done via [employer] and patient was informed that this is a secure, HIPAA-compliant platform  She agrees to proceed  Patient agrees to participate in a virtual check in via telephone or video visit instead of presenting to the office to address urgent/immediate medical needs  Patient is aware this is a billable service  After connecting through [facility], the patient was identified by name and date of birth  Alexis Mcnamara was informed that this was a telemedicine visit and that the exam was being conducted confidentially over secure lines  My office door was closed  No one else was in the room  Alexis Mcnamara acknowledged consent and understanding of privacy and security of the telemedicine visit  I informed the patient that I have reviewed her record in Epic and presented the opportunity for her to ask any questions regarding the visit today  The patient agreed to participate  Subjective:   Alexis Mcnamara is a 43 y o  female who is concerned about COVID-19   Patient's symptoms include fatigue, malaise, nasal congestion, rhinorrhea, sore throat, anosmia, loss of taste, cough, shortness of breath (using nebs), chest tightness, abdominal pain, nausea, myalgias and headache  Patient denies fever       Date of symptom onset: 9/16/2021  COVID-19 vaccination status: Fully vaccinated    Exposure:   Contact with a person who is under investigation (PUI) for or who is positive for COVID-19 within the last 14 days?: Yes    Hospitalized recently for fever and/or lower respiratory symptoms?: No      Currently a healthcare worker that is involved in direct patient care?: Yes      Works in a special setting where the risk of COVID-19 transmission may be high? (this may include long-term care, correctional and MCC facilities; homeless shelters; assisted-living facilities and group homes ): No      Resident in a special setting where the risk of COVID-19 transmission may be high? (this may include long-term care, correctional and MCC facilities; homeless shelters; assisted-living facilities and group homes ): No      Lab Results   Component Value Date    SARSCOV2 Positive (A) 09/17/2021     Past Medical History:   Diagnosis Date    Allergic     Asthma     GERD (gastroesophageal reflux disease)     Morbid obesity with BMI of 40 0-44 9, adult ([facility] [address] )      Past Surgical History:   Procedure Laterality Date    CHOLECYSTECTOMY      TONSILECTOMY AND ADNOIDECTOMY      TUBAL LIGATION       Current Outpatient Medications   Medication Sig Dispense Refill    Advair -21 MCG/ACT inhaler       albuterol (2 5 mg/3 mL) 0 083 % nebulizer solution Inhale      albuterol (PROVENTIL HFA,VENTOLIN HFA) 90 mcg/act inhaler Inhale 2 puffs every 6 (six) hours as needed for wheezing      Biotin 1 MG CAPS Take by mouth      cimetidine (TAGAMET) 400 mg tablet Take 400 mg by mouth 2 (two) times a day      Multiple Vitamins-Minerals (multivitamin with minerals) tablet Take 1 tablet by mouth daily      nystatin (MYCOSTATIN) 500,000 units/5 mL suspension Apply 5 mL (500,000 Units total) to the mouth or throat 4 (four) times a day 60 mL 1    ondansetron (ZOFRAN-ODT) 4 mg disintegrating tablet Take 1 tablet (4 mg total) by mouth every 6 (six) hours as needed for nausea or vomiting 20 tablet 0    phentermine (ADIPEX-P) 37 5 MG tablet Take 1 tablet (37 5 mg total) by mouth daily as needed (appetite control) 30 tablet 0     No current facility-administered medications for this visit  Allergies   Allergen Reactions    Lac Bovis Angioedema    Shrimp Extract Allergy Skin Test - Food Allergy Angioedema    Banana - Food Allergy Other (See Comments)     ulcerations    Fruit Extracts      Annotation - [id number]: bananas, kiwi, pineapples    Kiwi Extract - Food Allergy Other (See Comments)     tongue swelling    Latex Other (See Comments)     pt warned to avoid due to food allergies    Pineapple - Food Allergy Hives    Pollen Extract        Review of Systems   Constitutional: Positive for fatigue  Negative for fever  HENT: Positive for congestion, rhinorrhea and sore throat  Respiratory: Positive for cough, chest tightness and shortness of breath (using nebs)  Gastrointestinal: Positive for abdominal pain and nausea  Musculoskeletal: Positive for myalgias  Neurological: Positive for headaches  Objective: There were no vitals filed for this visit  Physical Exam  Vitals reviewed  Constitutional:       Appearance: She is well-developed  She is ill-appearing  Pulmonary:      Effort: Pulmonary effort is normal    Musculoskeletal:      Cervical back: Normal range of motion and neck supple  Neurological:      Mental Status: She is alert and oriented to person, place, and time  Psychiatric:         Mood and Affect: Mood normal          Behavior: Behavior normal          Thought Content: Thought content normal          Judgment: Judgment normal          VIRTUAL VISIT DISCLAIMER    Saima Delgado verbally agrees to participate in [employer]   Pt is aware that Virtual Care Services could be limited without vital signs or the ability to perform a full hands-on physical exam  Yvonne Delgado understands she or the provider may request at any time to terminate the video visit and request the patient to seek care or treatment in person

## 2021-09-24 ENCOUNTER — HOSPITAL ENCOUNTER (OUTPATIENT)
Dept: INFUSION CENTER | Facility: HOSPITAL | Age: 36
Discharge: HOME | End: 2021-09-24
Payer: COMMERCIAL

## 2021-09-24 VITALS
HEART RATE: 73 BPM | OXYGEN SATURATION: 99 % | TEMPERATURE: 98.5 F | SYSTOLIC BLOOD PRESSURE: 104 MMHG | DIASTOLIC BLOOD PRESSURE: 58 MMHG

## 2021-09-24 DIAGNOSIS — U07.1 COVID-19: Primary | ICD-10-CM

## 2021-09-24 PROCEDURE — M0245 HB BAMLAN AND ETESEV INF ADMIN: HCPCS | Performed by: FAMILY MEDICINE

## 2021-09-24 RX ORDER — ALBUTEROL SULFATE 90 UG/1
3 AEROSOL, METERED RESPIRATORY (INHALATION) ONCE AS NEEDED
Status: DISCONTINUED | OUTPATIENT
Start: 2021-09-24 | End: 2021-09-27 | Stop reason: HOSPADM

## 2021-09-24 RX ORDER — ONDANSETRON 2 MG/ML
4 INJECTION INTRAMUSCULAR; INTRAVENOUS ONCE AS NEEDED
Status: CANCELLED | OUTPATIENT
Start: 2021-09-24

## 2021-09-24 RX ORDER — SODIUM CHLORIDE 9 MG/ML
20 INJECTION, SOLUTION INTRAVENOUS ONCE
Status: CANCELLED | OUTPATIENT
Start: 2021-09-24

## 2021-09-24 RX ORDER — ACETAMINOPHEN 325 MG/1
650 TABLET ORAL ONCE AS NEEDED
Status: DISCONTINUED | OUTPATIENT
Start: 2021-09-24 | End: 2021-09-27 | Stop reason: HOSPADM

## 2021-09-24 RX ORDER — ONDANSETRON 2 MG/ML
4 INJECTION INTRAMUSCULAR; INTRAVENOUS ONCE AS NEEDED
Status: DISCONTINUED | OUTPATIENT
Start: 2021-09-24 | End: 2021-09-27 | Stop reason: HOSPADM

## 2021-09-24 RX ORDER — SODIUM CHLORIDE 9 MG/ML
20 INJECTION, SOLUTION INTRAVENOUS ONCE
Status: COMPLETED | OUTPATIENT
Start: 2021-09-24 | End: 2021-09-24

## 2021-09-24 RX ORDER — ACETAMINOPHEN 325 MG/1
650 TABLET ORAL ONCE AS NEEDED
Status: CANCELLED | OUTPATIENT
Start: 2021-09-24

## 2021-09-24 RX ORDER — ALBUTEROL SULFATE 90 UG/1
3 AEROSOL, METERED RESPIRATORY (INHALATION) ONCE AS NEEDED
Status: CANCELLED | OUTPATIENT
Start: 2021-09-24

## 2021-09-24 RX ADMIN — SODIUM CHLORIDE 20 ML/HR: 0.9 INJECTION, SOLUTION INTRAVENOUS at 17:18

## 2021-09-24 RX ADMIN — SODIUM CHLORIDE 2100 MG COMBINED: 9 INJECTION, SOLUTION INTRAVENOUS at 17:36

## 2021-09-24 NOTE — PROGRESS NOTES
Hour of observation completed, no signs of reaction, VSS  IV removed, discharge instructions reviewed with pt  Pt assisted to parking lot by nursing staff

## 2021-09-24 NOTE — PROGRESS NOTES
Pt arrived at infusion clinic for [facility] antibody treatment  VSS, IV access maintained, verbal consent given  Pharmacy notified

## 2021-09-28 ENCOUNTER — TELEMEDICINE (OUTPATIENT)
Dept: FAMILY MEDICINE CLINIC | Facility: CLINIC | Age: 36
End: 2021-09-28
Payer: COMMERCIAL

## 2021-09-28 DIAGNOSIS — U07.1 COVID-19: ICD-10-CM

## 2021-09-28 PROCEDURE — 99213 OFFICE O/P EST LOW 20 MIN: CPT | Performed by: INTERNAL MEDICINE

## 2021-09-28 NOTE — PROGRESS NOTES
COVID-19 Outpatient Progress Note    Assessment/Plan:    Problem List Items Addressed This Visit        Other    COVID-19         Disposition:     I have spent 12 minutes directly with the patient  Verification of patient location:    Patient is located in the following state in which I hold an active license [place]    Encounter provider Paolo Finn DO    Provider located at [address] [place] [id number] [phone]    Recent Visits  Date Type Provider Dept   09/30/21 1541 MD Clarence Maldonado   09/28/21 Telemedicine DO Clarence Ross   Showing recent visits within past 7 days and meeting all other requirements  Future Appointments  No visits were found meeting these conditions  Showing future appointments within next 150 days and meeting all other requirements     This virtual check-in was done via [employer] and patient was informed that this is a secure, HIPAA-compliant platform  She agrees to proceed  Patient agrees to participate in a virtual check in via telephone or video visit instead of presenting to the office to address urgent/immediate medical needs  Patient is aware this is a billable service  After connecting through [facility], the patient was identified by name and date of birth  Radha Jiménez was informed that this was a telemedicine visit and that the exam was being conducted confidentially over secure lines  My office door was closed  No one else was in the room  Radha Jiménez acknowledged consent and understanding of privacy and security of the telemedicine visit  I informed the patient that I have reviewed her record in Epic and presented the opportunity for her to ask any questions regarding the visit today  The patient agreed to participate  Subjective:   Radha Jiménez is a 43 y o  female who has been screened for COVID-19  Symptom change since last report: improving  Patient's symptoms include fatigue, malaise, anosmia, loss of taste, cough, shortness of breath, nausea and headache  Patient denies fever  Date of symptom onset: 9/15/2021  Date of positive COVID-19 PCR: 9/17/2021  COVID-19 vaccination status: Fully vaccinated        Staying home and isolating themselves?: has not  She is taking care to not share personal items and is cleaning all surfaces that are touched often, like counters, tabletops, and doorknobs using household cleaning sprays or wipes  Monoclonal Antibody Follow-up Symptom Questionnaire  I feel overall: somewhat better  My breathing is: somewhat better  My fever is: better  My fatigue is: somewhat better    She relates improving daily  Less sob  Less cough- fever  Used her inhaler prn        Lab Results   Component Value Date    SARSCOV2 Positive (A) 09/17/2021     Past Medical History:   Diagnosis Date    Allergic     Asthma     GERD (gastroesophageal reflux disease)     [facility] 40 0-44 9[facility]      Past Surgical History:   Procedure Laterality Date    CHOLECYSTECTOMY      TONSILECTOMY AND ADNOIDECTOMY      TUBAL LIGATION       Current Outpatient Medications   Medication Sig Dispense Refill    Advair -21 MCG/ACT inhaler       albuterol (2 5 mg/3 mL) 0 083 % nebulizer solution Inhale      albuterol (PROVENTIL HFA,VENTOLIN HFA) 90 mcg/act inhaler Inhale 2 puffs every 6 (six) hours as needed for wheezing      Biotin 1 MG CAPS Take by mouth      cimetidine (TAGAMET) 400 mg tablet Take 400 mg by mouth 2 (two) times a day      Multiple Vitamins-Minerals (multivitamin with minerals) tablet Take 1 tablet by mouth daily      nystatin (MYCOSTATIN) 500,000 units/5 mL suspension Apply 5 mL (500,000 Units total) to the mouth or throat 4 (four) times a day 60 mL 1    ondansetron (ZOFRAN-ODT) 4 mg disintegrating tablet Take 1 tablet (4 mg total) by mouth every 6 (six) hours as needed for nausea or vomiting 20 tablet 0    phentermine (ADIPEX-P) 37 5 MG tablet Take 1 tablet (37 5 mg total) by mouth daily as needed (appetite control) 30 tablet 0     No current facility-administered medications for this visit  Allergies   Allergen Reactions    Lac Bovis Angioedema    Shrimp Extract Allergy Skin Test - Food Allergy Angioedema    Banana - Food Allergy Other (See Comments)     ulcerations    Fruit Extracts      Annotation - [id number]: bananas, kiwi, pineapples    Kiwi Extract - Food Allergy Other (See Comments)     tongue swelling    Latex Other (See Comments)     pt warned to avoid due to food allergies    Pineapple - Food Allergy Hives    Pollen Extract        Review of Systems   Constitutional: Positive for fatigue  Negative for fever  Respiratory: Positive for cough and shortness of breath  Gastrointestinal: Positive for nausea  Neurological: Positive for headaches  Objective: There were no vitals filed for this visit  Physical Exam  Constitutional:       General: She is not in acute distress  Appearance: She is obese  HENT:      Head: Normocephalic and atraumatic  Pulmonary:      Effort: No respiratory distress  Neurological:      Mental Status: She is alert  VIRTUAL VISIT DISCLAIMER    Hansa Delgado verbally agrees to participate in [employer]  Pt is aware that [employer] could be limited without vital signs or the ability to perform a full hands-on physical exam  Yvonne Delgado understands she or the provider may request at any time to terminate the video visit and request the patient to seek care or treatment in person

## 2021-09-30 ENCOUNTER — TELEMEDICINE (OUTPATIENT)
Dept: FAMILY MEDICINE CLINIC | Facility: CLINIC | Age: 36
End: 2021-09-30
Payer: COMMERCIAL

## 2021-09-30 DIAGNOSIS — U07.1 COVID-19: Primary | ICD-10-CM

## 2021-09-30 PROCEDURE — 99213 OFFICE O/P EST LOW 20 MIN: CPT | Performed by: FAMILY MEDICINE

## 2021-09-30 NOTE — LETTER
September 30, 2021    Patient: Reuben Kowalski  YOB: 1985  Date of Last Encounter: 9/28/2021      To whom it may concern:     Reuben Kowalski has tested positive for COVID-19 (Coronavirus)  She may return to work on 10/4/21, which is 10 days from illness onset (provided symptoms are improving) and 24 hours without fever  Please allow half days (4 hours) for the first week of return to work       Sincerely,         Lisa Jarrett MD

## 2021-09-30 NOTE — PROGRESS NOTES
COVID-19 Outpatient Progress Note    Assessment/Plan:    Problem List Items Addressed This Visit        Other    COVID-19 - Primary         Disposition:     I have spent 10 minutes directly with the patient  Greater than 50% of this time was spent in counseling/coordination of care regarding: prognosis, instructions for management and impressions  Verification of patient location:    Patient is located in the following state in which I hold an active license [place]    Encounter provider Billy Pimentel MD    Provider located at [address] [place] [address]  [phone]    Recent Visits  Date Type Provider Dept   09/28/21 Telemedicine DO Clarence Chowdhury   Showing recent visits within past 7 days and meeting all other requirements  Today's Visits  Date Type Provider Dept   09/30/21 Telemedicine MD Clarence Tang   Showing today's visits and meeting all other requirements  Future Appointments  No visits were found meeting these conditions  Showing future appointments within next 150 days and meeting all other requirements         Subjective:   Federico Aguirre is a 43 y o  female who has been screened for COVID-19  Symptom change since last report: improving  Patient's symptoms include fatigue, sore throat and myalgias (upper back)  Patient denies fever, shortness of breath and chest tightness  Date of symptom onset: 9/15/2021  Date of positive COVID-19 PCR: 9/17/2021  COVID-19 vaccination status: Fully vaccinated    Glen Zheng has been staying home and has isolated themselves in her home  She is taking care to not share personal items and is cleaning all surfaces that are touched often, like counters, tabletops, and doorknobs using household cleaning sprays or wipes  She is wearing a mask when she leaves her room       Monoclonal Antibody Follow-up Symptom Questionnaire  I feel overall: somewhat better  My breathing is: similar  My fever is: better  My fatigue is: similar    signif fatigue, can do activity in bursts, but needs freq rest periods  Wants to try and get back to work  Can stay awake longer  Going for short walks on a familiar trail  Got the infusion on 9/24  Felt like that helped a great deal      Lab Results   Component Value Date    SARSCOV2 Positive (A) 09/17/2021     Past Medical History:   Diagnosis Date    Allergic     Asthma     GERD (gastroesophageal reflux disease)     Morbid obesity with BMI of 40 0-44 9, adult (HCC)      Past Surgical History:   Procedure Laterality Date    CHOLECYSTECTOMY      TONSILECTOMY AND ADNOIDECTOMY      TUBAL LIGATION       Current Outpatient Medications   Medication Sig Dispense Refill    Advair -21 MCG/ACT inhaler       albuterol (2 5 mg/3 mL) 0 083 % nebulizer solution Inhale      albuterol (PROVENTIL HFA,VENTOLIN HFA) 90 mcg/act inhaler Inhale 2 puffs every 6 (six) hours as needed for wheezing      Biotin 1 MG CAPS Take by mouth      cimetidine (TAGAMET) 400 mg tablet Take 400 mg by mouth 2 (two) times a day      Multiple Vitamins-Minerals (multivitamin with minerals) tablet Take 1 tablet by mouth daily      nystatin (MYCOSTATIN) 500,000 units/5 mL suspension Apply 5 mL (500,000 Units total) to the mouth or throat 4 (four) times a day 60 mL 1    ondansetron (ZOFRAN-ODT) 4 mg disintegrating tablet Take 1 tablet (4 mg total) by mouth every 6 (six) hours as needed for nausea or vomiting 20 tablet 0    phentermine (ADIPEX-P) 37 5 MG tablet Take 1 tablet (37 5 mg total) by mouth daily as needed (appetite control) 30 tablet 0     No current facility-administered medications for this visit       Allergies   Allergen Reactions    Lac Bovis Angioedema    Shrimp Extract Allergy Skin Test - Food Allergy Angioedema    Banana - Food Allergy Other (See Comments)     ulcerations    Fruit Extracts      Annotation - [id number]: bananas, kiwi, pineapples    Kiwi Extract - Food Allergy Other (See Comments)     tongue swelling    Latex Other (See Comments)     pt warned to avoid due to food allergies    Pineapple - Food Allergy Hives    Pollen Extract        Review of Systems   Constitutional: Positive for fatigue  Negative for fever  HENT: Positive for sore throat  Respiratory: Negative for chest tightness and shortness of breath  Musculoskeletal: Positive for myalgias (upper back)  Objective: There were no vitals filed for this visit  Physical Exam  Vitals reviewed  Constitutional:       Appearance: Normal appearance  She is well-developed  Pulmonary:      Effort: Pulmonary effort is normal    Musculoskeletal:      Cervical back: Normal range of motion and neck supple  Neurological:      Mental Status: She is alert and oriented to person, place, and time  Psychiatric:         Mood and Affect: Mood normal          Behavior: Behavior normal          Thought Content: Thought content normal          Judgment: Judgment normal          VIRTUAL VISIT DISCLAIMER    Glen Delgado verbally agrees to participate in [employer]  Pt is aware that [employer] could be limited without vital signs or the ability to perform a full hands-on physical exam  Yvonne Delgado understands she or the provider may request at any time to terminate the video visit and request the patient to seek care or treatment in person

## 2021-10-04 ENCOUNTER — EVALUATION (OUTPATIENT)
Dept: PHYSICAL THERAPY | Facility: MEDICAL CENTER | Age: 36
End: 2021-10-04
Payer: COMMERCIAL

## 2021-10-04 DIAGNOSIS — R42 VERTIGO: Primary | ICD-10-CM

## 2021-10-04 PROCEDURE — 97161 PT EVAL LOW COMPLEX 20 MIN: CPT | Performed by: PHYSICAL THERAPIST

## 2021-10-11 ENCOUNTER — OFFICE VISIT (OUTPATIENT)
Dept: PHYSICAL THERAPY | Facility: MEDICAL CENTER | Age: 36
End: 2021-10-11
Payer: COMMERCIAL

## 2021-10-11 DIAGNOSIS — R42 VERTIGO: Primary | ICD-10-CM

## 2021-10-11 PROCEDURE — 97112 NEUROMUSCULAR REEDUCATION: CPT | Performed by: PHYSICAL THERAPIST

## 2021-10-21 ENCOUNTER — OFFICE VISIT (OUTPATIENT)
Dept: PHYSICAL THERAPY | Facility: MEDICAL CENTER | Age: 36
End: 2021-10-21
Payer: COMMERCIAL

## 2021-10-21 DIAGNOSIS — U07.1 COVID-19: ICD-10-CM

## 2021-10-21 DIAGNOSIS — R42 VERTIGO: Primary | ICD-10-CM

## 2021-10-22 ENCOUNTER — OFFICE VISIT (OUTPATIENT)
Dept: FAMILY MEDICINE CLINIC | Facility: CLINIC | Age: 36
End: 2021-10-22
Payer: COMMERCIAL

## 2021-10-22 VITALS
BODY MASS INDEX: 39.97 KG/M2 | DIASTOLIC BLOOD PRESSURE: 72 MMHG | WEIGHT: 217.2 LBS | HEART RATE: 79 BPM | HEIGHT: 62 IN | SYSTOLIC BLOOD PRESSURE: 110 MMHG | OXYGEN SATURATION: 100 % | TEMPERATURE: 98.6 F

## 2021-10-22 DIAGNOSIS — U07.1 COVID-19: Primary | ICD-10-CM

## 2021-10-22 DIAGNOSIS — R53.83 MALAISE AND FATIGUE: ICD-10-CM

## 2021-10-22 DIAGNOSIS — R53.81 MALAISE AND FATIGUE: ICD-10-CM

## 2021-10-22 DIAGNOSIS — G43.809 OTHER MIGRAINE WITHOUT STATUS MIGRAINOSUS, NOT INTRACTABLE: ICD-10-CM

## 2021-10-22 DIAGNOSIS — J45.20 MILD INTERMITTENT ASTHMA WITHOUT COMPLICATION: ICD-10-CM

## 2021-10-22 DIAGNOSIS — H81.10 BENIGN PAROXYSMAL POSITIONAL VERTIGO, UNSPECIFIED LATERALITY: ICD-10-CM

## 2021-10-22 PROCEDURE — 99214 OFFICE O/P EST MOD 30 MIN: CPT | Performed by: FAMILY MEDICINE

## 2021-10-22 RX ORDER — ACETAMINOPHEN 500 MG
500 TABLET ORAL EVERY 8 HOURS SCHEDULED
Qty: 90 TABLET | Refills: 1 | Status: SHIPPED | OUTPATIENT
Start: 2021-10-22

## 2021-10-22 RX ORDER — MECLIZINE HCL 12.5 MG/1
12.5 TABLET ORAL 3 TIMES DAILY PRN
Qty: 30 TABLET | Refills: 0 | Status: SHIPPED | OUTPATIENT
Start: 2021-10-22 | End: 2022-06-01

## 2021-10-25 ENCOUNTER — APPOINTMENT (OUTPATIENT)
Dept: PHYSICAL THERAPY | Facility: MEDICAL CENTER | Age: 36
End: 2021-10-25
Payer: COMMERCIAL

## 2021-10-26 ENCOUNTER — APPOINTMENT (OUTPATIENT)
Dept: PHYSICAL THERAPY | Facility: MEDICAL CENTER | Age: 36
End: 2021-10-26
Payer: COMMERCIAL

## 2021-10-28 ENCOUNTER — APPOINTMENT (OUTPATIENT)
Dept: PHYSICAL THERAPY | Facility: MEDICAL CENTER | Age: 36
End: 2021-10-28
Payer: COMMERCIAL

## 2021-11-16 DIAGNOSIS — E66.01 OBESITY, CLASS III, BMI 40-49.9 (MORBID OBESITY) (HCC): ICD-10-CM

## 2021-11-16 RX ORDER — PHENTERMINE HYDROCHLORIDE 37.5 MG/1
37.5 TABLET ORAL DAILY PRN
Qty: 30 TABLET | Refills: 0 | Status: SHIPPED | OUTPATIENT
Start: 2021-11-16 | End: 2022-03-21 | Stop reason: SDUPTHER

## 2021-12-03 ENCOUNTER — HOSPITAL ENCOUNTER (OUTPATIENT)
Dept: RADIOLOGY | Facility: MEDICAL CENTER | Age: 36
Discharge: HOME | End: 2021-12-03
Payer: COMMERCIAL

## 2021-12-03 DIAGNOSIS — M79.604 BILATERAL LEG PAIN: ICD-10-CM

## 2021-12-03 DIAGNOSIS — M79.604 BILATERAL LEG PAIN: Primary | ICD-10-CM

## 2021-12-03 DIAGNOSIS — M79.605 BILATERAL LEG PAIN: Primary | ICD-10-CM

## 2021-12-03 DIAGNOSIS — M79.605 BILATERAL LEG PAIN: ICD-10-CM

## 2021-12-03 PROCEDURE — 93970 EXTREMITY STUDY: CPT

## 2021-12-06 PROCEDURE — 93970 EXTREMITY STUDY: CPT | Performed by: SURGERY

## 2021-12-23 ENCOUNTER — OFFICE VISIT (OUTPATIENT)
Dept: FAMILY MEDICINE CLINIC | Facility: CLINIC | Age: 36
End: 2021-12-23
Payer: COMMERCIAL

## 2021-12-23 VITALS
HEIGHT: 62 IN | WEIGHT: 216.4 LBS | TEMPERATURE: 97.7 F | OXYGEN SATURATION: 96 % | HEART RATE: 78 BPM | SYSTOLIC BLOOD PRESSURE: 110 MMHG | BODY MASS INDEX: 39.82 KG/M2 | DIASTOLIC BLOOD PRESSURE: 74 MMHG

## 2021-12-23 DIAGNOSIS — M62.838 MUSCLE SPASM OF LEFT LOWER EXTREMITY: Primary | ICD-10-CM

## 2021-12-23 DIAGNOSIS — G89.29 POST-COVID CHRONIC MUSCLE PAIN: ICD-10-CM

## 2021-12-23 DIAGNOSIS — R41.89 BRAIN FOG: ICD-10-CM

## 2021-12-23 DIAGNOSIS — M79.10 POST-COVID CHRONIC MUSCLE PAIN: ICD-10-CM

## 2021-12-23 DIAGNOSIS — U09.9 POST-COVID CHRONIC MUSCLE PAIN: ICD-10-CM

## 2021-12-23 DIAGNOSIS — U09.9 POST-COVID-19 SYNDROME: ICD-10-CM

## 2021-12-23 PROBLEM — E66.812 CLASS 2 OBESITY DUE TO EXCESS CALORIES WITHOUT SERIOUS COMORBIDITY WITH BODY MASS INDEX (BMI) OF 39.0 TO 39.9 IN ADULT: Status: ACTIVE | Noted: 2020-11-12

## 2021-12-23 PROBLEM — E66.09 CLASS 2 OBESITY DUE TO EXCESS CALORIES WITHOUT SERIOUS COMORBIDITY WITH BODY MASS INDEX (BMI) OF 39.0 TO 39.9 IN ADULT: Status: ACTIVE | Noted: 2020-11-12

## 2021-12-23 PROCEDURE — 99214 OFFICE O/P EST MOD 30 MIN: CPT | Performed by: FAMILY MEDICINE

## 2021-12-23 RX ORDER — CYCLOBENZAPRINE HCL 10 MG
10 TABLET ORAL 3 TIMES DAILY PRN
Qty: 30 TABLET | Refills: 0 | Status: SHIPPED | OUTPATIENT
Start: 2021-12-23 | End: 2022-06-30 | Stop reason: SDUPTHER

## 2022-02-01 ENCOUNTER — TELEPHONE (OUTPATIENT)
Dept: FAMILY MEDICINE CLINIC | Facility: CLINIC | Age: 37
End: 2022-02-01

## 2022-02-01 DIAGNOSIS — B34.9 VIRAL INFECTION, UNSPECIFIED: Primary | ICD-10-CM

## 2022-02-01 LAB
SARS-COV-2 AG UPPER RESP QL IA: NEGATIVE
VALID CONTROL: NORMAL

## 2022-02-01 NOTE — TELEPHONE ENCOUNTER
[employer] employee being asked to get tested  Was exposed over the weekend and has a sore throat

## 2022-02-24 ENCOUNTER — OFFICE VISIT (OUTPATIENT)
Dept: FAMILY MEDICINE CLINIC | Facility: CLINIC | Age: 37
End: 2022-02-24
Payer: COMMERCIAL

## 2022-02-24 VITALS
BODY MASS INDEX: 39.93 KG/M2 | WEIGHT: 217 LBS | TEMPERATURE: 98.1 F | HEIGHT: 62 IN | SYSTOLIC BLOOD PRESSURE: 106 MMHG | DIASTOLIC BLOOD PRESSURE: 70 MMHG | OXYGEN SATURATION: 95 % | RESPIRATION RATE: 16 BRPM | HEART RATE: 69 BPM

## 2022-02-24 DIAGNOSIS — U07.1 COVID-19: Primary | ICD-10-CM

## 2022-02-24 PROCEDURE — 99214 OFFICE O/P EST MOD 30 MIN: CPT | Performed by: FAMILY MEDICINE

## 2022-02-24 RX ORDER — BENZONATATE 100 MG/1
100 CAPSULE ORAL 3 TIMES DAILY PRN
Qty: 20 CAPSULE | Refills: 0 | Status: SHIPPED | OUTPATIENT
Start: 2022-02-24 | End: 2022-06-01

## 2022-02-24 NOTE — ASSESSMENT & PLAN NOTE
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  Date of positive test: 2/16/22    Symptoms includes: fever (fever 102 on wednesday, resolved), fatigue, malaise, nasal congestion, rhinorrhea, sore throat, cough, shortness of breath, chest tightness (have being using nebulizer machine), nausea (on thursday), diarrhea, myalgias and headache (baseline migraine, feels similar)  Please maintain appropriate social distancing, wear a mask at all public spaces, wash hands frequently and clean surface after use  If you have fever greater than 104° that does not respond to Tylenol, difficulty eating or drinking, difficulty breathing please report to ER for evaluation    Patient's most significant symptom is cough, other symptoms are slowly improving  Exam today did not show any decreased air floor crackles    Patient may continue using her asthma inhaler medication as well as nebulizer up to every 4 hours as needed    Most prescribed Tessalon Perle to help control with cough  Patient may continue using DayQuil and NyQuil for symptomatic relief

## 2022-02-24 NOTE — PROGRESS NOTES
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  Date of positive test: 2/16/22    Symptoms includes: fever (fever 102 on wednesday, resolved), fatigue, malaise, nasal congestion, rhinorrhea, sore throat, cough, shortness of breath, chest tightness (have being using nebulizer machine), nausea (on thursday), diarrhea, myalgias and headache (baseline migraine, feels similar)  Please maintain appropriate social distancing, wear a mask at all public spaces, wash hands frequently and clean surface after use  If you have fever greater than 104° that does not respond to Tylenol, difficulty eating or drinking, difficulty breathing please report to ER for evaluation    Patient's most significant symptom is cough, other symptoms are slowly improving  Exam today did not show any decreased air floor crackles  Patient may continue using her asthma inhaler medication as well as nebulizer up to every 4 hours as needed    Most prescribed Tessalon Perle to help control with cough  Patient may continue using DayQuil and NyQuil for symptomatic relief         Relevant Medications    benzonatate (TESSALON PERLES) 100 mg capsule         Disposition:     I have spent 20 minutes directly with the patient  Encounter provider Briana Alvarado MD    Provider located at [address] [place] [id number]  [phone]    Recent Visits  No visits were found meeting these conditions    Showing recent visits within past 7 days and meeting all other requirements  Today's Visits  Date Type Provider Dept   02/24/22 Office Visit MD Clarence Stanley   Showing today's visits and meeting all other requirements  Future Appointments  No visits were found meeting these conditions  Showing future appointments within next 150 days and meeting all other requirements     Subjective:   Desiree Llanos is a 43 y o  female who is concerned about COVID-19  Patient's symptoms include fever (fever 102 on wednesday, resolved), fatigue, malaise, nasal congestion, rhinorrhea, sore throat, cough, shortness of breath, chest tightness (have being using nebulizer machine), nausea (on thursday), diarrhea, myalgias and headache (baseline migraine, feels similar)  Patient denies chills, anosmia, loss of taste, abdominal pain and vomiting      - Date of symptom onset: 2/16/2022      COVID-19 vaccination status: Fully vaccinated (primary series)    Exposure:   Contact with a person who is under investigation (PUI) for or who is positive for COVID-19 within the last 14 days?: No    Hospitalized recently for fever and/or lower respiratory symptoms?: No      Currently a healthcare worker that is involved in direct patient care?: No      Works in a special setting where the risk of COVID-19 transmission may be high? (this may include long-term care, correctional and retirement facilities; homeless shelters; assisted-living facilities and group homes ): No      Resident in a special setting where the risk of COVID-19 transmission may be high? (this may include long-term care, correctional and retirement facilities; homeless shelters; assisted-living facilities and group homes ): No      33-year-old female patient presents for evaluation of symptom persisting after COVID-19  Initial symptom presented around 9/17/21 which was tested positive via PCR  Overall, patient's resolved but continues her persisting leg pain, weakness and COVID fog  Patient started having upper respiratory symptoms on 02/16/2022, repeat home test which she tested positive  Sore throat improving after OCT  Continues to have a cough    Patient continues use DayQuil NyQuil for symptomatic management  Patient is seen use walker, 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  Her cough is interfering with her ability to perform her job safely  Patient was increased use of albuterol medication, up to 3-4 times a day  Patient's appetite and oral intake is improving                Lab Results   Component Value Date    SARSCOV2 Positive (A) 09/17/2021    SARSCOVAG Negative 02/01/2022     Past Medical History:   Diagnosis Date    Allergic     Asthma     GERD (gastroesophageal reflux disease)     Morbid obesity with BMI of 40 0-44 9, adult ([facility] [address] )      Past Surgical History:   Procedure Laterality Date    CHOLECYSTECTOMY      TONSILECTOMY AND ADNOIDECTOMY      TUBAL LIGATION       Current Outpatient Medications   Medication Sig Dispense Refill    acetaminophen (TYLENOL) 500 mg tablet Take 1 tablet (500 mg total) by mouth every 8 (eight) hours 90 tablet 1    Advair -21 MCG/ACT inhaler       albuterol (2 5 mg/3 mL) 0 083 % nebulizer solution Inhale      albuterol (PROVENTIL HFA,VENTOLIN HFA) 90 mcg/act inhaler Inhale 2 puffs every 6 (six) hours as needed for wheezing      cimetidine (TAGAMET) 400 mg tablet Take 400 mg by mouth 2 (two) times a day      cyclobenzaprine (FLEXERIL) 10 mg tablet Take 1 tablet (10 mg total) by mouth 3 (three) times a day as needed for muscle spasms 30 tablet 0    Multiple Vitamins-Minerals (multivitamin with minerals) tablet Take 1 tablet by mouth daily      phentermine (ADIPEX-P) 37 5 MG tablet Take 1 tablet (37 5 mg total) by mouth daily as needed (appetite control) 30 tablet 0    benzonatate (TESSALON PERLES) 100 mg capsule Take 1 capsule (100 mg total) by mouth 3 (three) times a day as needed for cough 20 capsule 0    Biotin 1 MG CAPS Take by mouth      meclizine (ANTIVERT) 12 5 MG tablet Take 1 tablet (12 5 mg total) by mouth 3 (three) times a day as needed for dizziness (Patient not taking: Reported on 2/24/2022 ) 30 tablet 0    nystatin (MYCOSTATIN) 500,000 units/5 mL suspension Apply 5 mL (500,000 Units total) to the mouth or throat 4 (four) times a day 60 mL 1    ondansetron (ZOFRAN-ODT) 4 mg disintegrating tablet Take 1 tablet (4 mg total) by mouth every 6 (six) hours as needed for nausea or vomiting (Patient not taking: Reported on 2/24/2022 ) 20 tablet 0     No current facility-administered medications for this visit  Allergies   Allergen Reactions    Lac Bovis Angioedema    Shrimp Extract Allergy Skin Test - Food Allergy Angioedema    Banana - Food Allergy Other (See Comments)     ulcerations    Fruit Extracts      Annotation - [id number]: bananas, kiwi, pineapples    Kiwi Extract - Food Allergy Other (See Comments)     tongue swelling    Latex Other (See Comments)     pt warned to avoid due to food allergies    Pineapple - Food Allergy Hives    Pollen Extract        Review of Systems   Constitutional: Positive for fatigue and fever (fever 102 on wednesday, resolved)  Negative for chills  HENT: Positive for congestion, rhinorrhea and sore throat  Respiratory: Positive for cough, chest tightness (have being using nebulizer machine) and shortness of breath  Gastrointestinal: Positive for diarrhea and nausea (on thursday)  Negative for abdominal pain and vomiting  Musculoskeletal: Positive for myalgias  Neurological: Positive for headaches (baseline migraine, feels similar)  Objective:    Vitals:    02/24/22 1511   BP: 106/70   BP Location: Right arm   Patient Position: Sitting   Cuff Size: Large   Pulse: 69   Resp: 16   Temp: 98 1 °F (36 7 °C)   TempSrc: Temporal   SpO2: 95%   Weight: 98 4 kg (217 lb)   Height: 5' 2" (1 575 m)       Physical Exam  Vitals reviewed  Constitutional:       General: She is not in acute distress  Appearance: Normal appearance  She is obese  She is not ill-appearing, toxic-appearing or diaphoretic     Cardiovascular:      Rate and Rhythm: Normal rate and regular rhythm  Pulses: Normal pulses  Heart sounds: Normal heart sounds  No murmur heard  Pulmonary:      Effort: Pulmonary effort is normal  No respiratory distress  Breath sounds: Normal breath sounds  No stridor  No wheezing, rhonchi or rales  Comments: Cough present  Chest:      Chest wall: No tenderness  Abdominal:      General: Abdomen is flat  Bowel sounds are normal  There is no distension  Palpations: Abdomen is soft  Musculoskeletal:         General: No swelling, tenderness, deformity or signs of injury  Normal range of motion  Skin:     General: Skin is warm and dry  Capillary Refill: Capillary refill takes less than 2 seconds  Coloration: Skin is not jaundiced  Neurological:      General: No focal deficit present  Mental Status: She is alert  Psychiatric:         Mood and Affect: Mood normal          VIRTUAL VISIT DISCLAIMER    Deloise Schirmer verbally agrees to participate in [employer]  Pt is aware that [employer] could be limited without vital signs or the ability to perform a full hands-on physical exam  Yvonne Delgado understands she or the provider may request at any time to terminate the video visit and request the patient to seek care or treatment in person

## 2022-03-21 DIAGNOSIS — E66.01 OBESITY, CLASS III, BMI 40-49.9 (MORBID OBESITY) (HCC): ICD-10-CM

## 2022-03-22 RX ORDER — PHENTERMINE HYDROCHLORIDE 37.5 MG/1
37.5 TABLET ORAL DAILY PRN
Qty: 30 TABLET | Refills: 0 | Status: SHIPPED | OUTPATIENT
Start: 2022-03-22 | End: 2022-06-30 | Stop reason: SDUPTHER

## 2022-03-30 ENCOUNTER — APPOINTMENT (OUTPATIENT)
Dept: LAB | Facility: MEDICAL CENTER | Age: 37
End: 2022-03-30

## 2022-04-25 ENCOUNTER — OFFICE VISIT (OUTPATIENT)
Dept: FAMILY MEDICINE CLINIC | Facility: CLINIC | Age: 37
End: 2022-04-25
Payer: COMMERCIAL

## 2022-04-25 VITALS
OXYGEN SATURATION: 98 % | BODY MASS INDEX: 41 KG/M2 | HEART RATE: 74 BPM | TEMPERATURE: 97.5 F | WEIGHT: 222.8 LBS | DIASTOLIC BLOOD PRESSURE: 66 MMHG | HEIGHT: 62 IN | SYSTOLIC BLOOD PRESSURE: 108 MMHG

## 2022-04-25 DIAGNOSIS — G89.29 POST-COVID CHRONIC MUSCLE PAIN: ICD-10-CM

## 2022-04-25 DIAGNOSIS — R23.8 CHANGE OF SKIN COLOR: ICD-10-CM

## 2022-04-25 DIAGNOSIS — U09.9 POST-COVID CHRONIC MUSCLE PAIN: ICD-10-CM

## 2022-04-25 DIAGNOSIS — M79.604 BILATERAL LEG PAIN: Primary | ICD-10-CM

## 2022-04-25 DIAGNOSIS — M79.10 POST-COVID CHRONIC MUSCLE PAIN: ICD-10-CM

## 2022-04-25 DIAGNOSIS — U09.9 POST-COVID-19 SYNDROME: ICD-10-CM

## 2022-04-25 DIAGNOSIS — M79.605 BILATERAL LEG PAIN: Primary | ICD-10-CM

## 2022-04-25 PROCEDURE — 99214 OFFICE O/P EST MOD 30 MIN: CPT | Performed by: FAMILY MEDICINE

## 2022-04-25 NOTE — PROGRESS NOTES
Assessment/Plan:    1  Bilateral leg pain  -     C-reactive protein; Future  -     Magnesium; Future  -     TSH, 3rd generation; Future  -     CK; Future  -     Antinuclear Antibodies, IFA; Future  -     Sedimentation rate, automated; Future  -     CBC and differential; Future  -     Comprehensive metabolic panel; Future  -     VAS lower limb venous duplex study, complete bilateral; Future; Expected date: 04/25/2022    2  Post-COVID-19 syndrome  -     C-reactive protein; Future  -     Magnesium; Future  -     TSH, 3rd generation; Future  -     CK; Future  -     Antinuclear Antibodies, IFA; Future  -     Sedimentation rate, automated; Future  -     CBC and differential; Future  -     Comprehensive metabolic panel; Future  -     VAS lower limb venous duplex study, complete bilateral; Future; Expected date: 04/25/2022    3  Post-COVID chronic muscle pain  -     C-reactive protein; Future  -     Magnesium; Future  -     TSH, 3rd generation; Future  -     CK; Future  -     Antinuclear Antibodies, IFA; Future  -     Sedimentation rate, automated; Future  -     CBC and differential; Future  -     Comprehensive metabolic panel; Future  -     VAS lower limb venous duplex study, complete bilateral; Future; Expected date: 04/25/2022    4  Change of skin color  -     C-reactive protein; Future  -     Magnesium; Future  -     TSH, 3rd generation; Future  -     CK; Future  -     Antinuclear Antibodies, IFA; Future  -     Sedimentation rate, automated; Future  -     CBC and differential; Future  -     Comprehensive metabolic panel; Future        There are no Patient Instructions on file for this visit  Return if symptoms worsen or fail to improve  Subjective:      Patient ID: Koko Oleary is a 42 y o  female  Chief Complaint   Patient presents with    Leg Pain       C/o persistent leg pain L>R  Feeling heat below the knee, tightness in the lateral aspect  Neg doppler in December 21, COVID in 9/21   Muscle relaxers have not helped  Can't fully extend the leg  daughter with dermatomyositis since age 1, now age 15  Leg does appear to have bruising striations, but no trauma  Sense of smell is not 100%  Taste is about 90%  oversalting her food, gaining some weight, leg swelling? Pain interferes with work, has had to call out, cannot walk as much as she's used to  Limping some days  Still sleeping with the heating pad  L sided symptoms  Sometimes fingers L hand turn blue, similar to raynauds  The following portions of the patient's history were reviewed and updated as appropriate: allergies, current medications, past family history, past medical history, past social history, past surgical history and problem list     Review of Systems   Constitutional: Negative for fatigue and fever  HENT: Negative for congestion  Eyes: Negative for visual disturbance  Respiratory: Negative for chest tightness and shortness of breath  Cardiovascular: Negative for chest pain and palpitations  Gastrointestinal: Negative for abdominal pain  Endocrine: Positive for cold intolerance  Genitourinary: Negative for difficulty urinating  Musculoskeletal: Positive for myalgias (muscle stiffness in the AM)  Negative for arthralgias  Skin: Positive for color change  Negative for rash  Neurological: Negative for headaches  Hematological: Does not bruise/bleed easily           Current Outpatient Medications   Medication Sig Dispense Refill    acetaminophen (TYLENOL) 500 mg tablet Take 1 tablet (500 mg total) by mouth every 8 (eight) hours 90 tablet 1    Advair -21 MCG/ACT inhaler       albuterol (2 5 mg/3 mL) 0 083 % nebulizer solution Inhale      albuterol (PROVENTIL HFA,VENTOLIN HFA) 90 mcg/act inhaler Inhale 2 puffs every 6 (six) hours as needed for wheezing      cimetidine (TAGAMET) 400 mg tablet Take 400 mg by mouth 2 (two) times a day      cyclobenzaprine (FLEXERIL) 10 mg tablet Take 1 tablet (10 mg total) by mouth 3 (three) times a day as needed for muscle spasms 30 tablet 0    Multiple Vitamins-Minerals (multivitamin with minerals) tablet Take 1 tablet by mouth daily      phentermine (ADIPEX-P) 37 5 MG tablet Take 1 tablet (37 5 mg total) by mouth daily as needed (appetite control) 30 tablet 0    benzonatate (TESSALON PERLES) 100 mg capsule Take 1 capsule (100 mg total) by mouth 3 (three) times a day as needed for cough 20 capsule 0    Biotin 1 MG CAPS Take by mouth      meclizine (ANTIVERT) 12 5 MG tablet Take 1 tablet (12 5 mg total) by mouth 3 (three) times a day as needed for dizziness (Patient not taking: Reported on 2/24/2022 ) 30 tablet 0    nystatin (MYCOSTATIN) 500,000 units/5 mL suspension Apply 5 mL (500,000 Units total) to the mouth or throat 4 (four) times a day 60 mL 1    ondansetron (ZOFRAN-ODT) 4 mg disintegrating tablet Take 1 tablet (4 mg total) by mouth every 6 (six) hours as needed for nausea or vomiting (Patient not taking: Reported on 2/24/2022 ) 20 tablet 0     No current facility-administered medications for this visit  Objective:    /66 (BP Location: Right arm, Patient Position: Sitting, Cuff Size: Standard)   Pulse 74   Temp 97 5 °F (36 4 °C)   Ht 5' 2" (1 575 m)   Wt 101 kg (222 lb 12 8 oz)   LMP 03/23/2022   SpO2 98%   BMI 40 75 kg/m²        Physical Exam  Vitals reviewed  Constitutional:       Appearance: Normal appearance  She is well-developed  Cardiovascular:      Rate and Rhythm: Normal rate and regular rhythm  Heart sounds: Normal heart sounds  Pulmonary:      Effort: Pulmonary effort is normal       Breath sounds: Normal breath sounds  Musculoskeletal:         General: Tenderness present  No swelling  Right lower leg: No edema  Left lower leg: No edema  Comments: No cord like structure palpated in the LLE, 4/5 strength in LLE with hip flexion, leg extension   Skin:     General: Skin is warm     Neurological:      Mental Status: She is alert and oriented to person, place, and time  Psychiatric:         Mood and Affect: Mood normal          Behavior: Behavior normal          Thought Content:  Thought content normal          Judgment: Judgment normal                 Arie Matias MD

## 2022-04-26 ENCOUNTER — APPOINTMENT (OUTPATIENT)
Dept: LAB | Facility: MEDICAL CENTER | Age: 37
End: 2022-04-26
Payer: COMMERCIAL

## 2022-04-26 DIAGNOSIS — M79.10 POST-COVID CHRONIC MUSCLE PAIN: ICD-10-CM

## 2022-04-26 DIAGNOSIS — G89.29 POST-COVID CHRONIC MUSCLE PAIN: ICD-10-CM

## 2022-04-26 DIAGNOSIS — M79.604 BILATERAL LEG PAIN: ICD-10-CM

## 2022-04-26 DIAGNOSIS — U09.9 POST-COVID-19 SYNDROME: ICD-10-CM

## 2022-04-26 DIAGNOSIS — M79.605 BILATERAL LEG PAIN: ICD-10-CM

## 2022-04-26 DIAGNOSIS — R23.8 CHANGE OF SKIN COLOR: ICD-10-CM

## 2022-04-26 DIAGNOSIS — U09.9 POST-COVID CHRONIC MUSCLE PAIN: ICD-10-CM

## 2022-04-26 LAB
ALBUMIN SERPL BCP-MCNC: 3.4 G/DL (ref 3.5–5)
ALP SERPL-CCNC: 57 U/L (ref 46–116)
ALT SERPL W P-5'-P-CCNC: 19 U/L (ref 12–78)
ANION GAP SERPL CALCULATED.3IONS-SCNC: 1 MMOL/L (ref 4–13)
AST SERPL W P-5'-P-CCNC: 10 U/L (ref 5–45)
BASOPHILS # BLD AUTO: 0.04 THOUSANDS/ΜL (ref 0–0.1)
BASOPHILS NFR BLD AUTO: 1 % (ref 0–1)
BILIRUB SERPL-MCNC: 0.62 MG/DL (ref 0.2–1)
BUN SERPL-MCNC: 12 MG/DL (ref 5–25)
CALCIUM ALBUM COR SERPL-MCNC: 9.7 MG/DL (ref 8.3–10.1)
CALCIUM SERPL-MCNC: 9.2 MG/DL (ref 8.3–10.1)
CHLORIDE SERPL-SCNC: 110 MMOL/L (ref 100–108)
CK SERPL-CCNC: 91 U/L (ref 26–192)
CO2 SERPL-SCNC: 29 MMOL/L (ref 21–32)
CREAT SERPL-MCNC: 0.93 MG/DL (ref 0.6–1.3)
CRP SERPL QL: <3 MG/L
EOSINOPHIL # BLD AUTO: 0.19 THOUSAND/ΜL (ref 0–0.61)
EOSINOPHIL NFR BLD AUTO: 3 % (ref 0–6)
ERYTHROCYTE [DISTWIDTH] IN BLOOD BY AUTOMATED COUNT: 13.6 % (ref 11.6–15.1)
ERYTHROCYTE [SEDIMENTATION RATE] IN BLOOD: 29 MM/HOUR (ref 0–19)
GFR SERPL CREATININE-BSD FRML MDRD: 77 ML/MIN/1.73SQ M
GLUCOSE P FAST SERPL-MCNC: 98 MG/DL (ref 65–99)
HCT VFR BLD AUTO: 40.4 % (ref 34.8–46.1)
HGB BLD-MCNC: 12.4 G/DL (ref 11.5–15.4)
IMM GRANULOCYTES # BLD AUTO: 0.01 THOUSAND/UL (ref 0–0.2)
IMM GRANULOCYTES NFR BLD AUTO: 0 % (ref 0–2)
LYMPHOCYTES # BLD AUTO: 1.47 THOUSANDS/ΜL (ref 0.6–4.47)
LYMPHOCYTES NFR BLD AUTO: 25 % (ref 14–44)
MAGNESIUM SERPL-MCNC: 2.2 MG/DL (ref 1.6–2.6)
MCH RBC QN AUTO: 25.7 PG (ref 26.8–34.3)
MCHC RBC AUTO-ENTMCNC: 30.7 G/DL (ref 31.4–37.4)
MCV RBC AUTO: 84 FL (ref 82–98)
MONOCYTES # BLD AUTO: 0.54 THOUSAND/ΜL (ref 0.17–1.22)
MONOCYTES NFR BLD AUTO: 9 % (ref 4–12)
NEUTROPHILS # BLD AUTO: 3.7 THOUSANDS/ΜL (ref 1.85–7.62)
NEUTS SEG NFR BLD AUTO: 62 % (ref 43–75)
NRBC BLD AUTO-RTO: 0 /100 WBCS
PLATELET # BLD AUTO: 223 THOUSANDS/UL (ref 149–390)
PMV BLD AUTO: 12.3 FL (ref 8.9–12.7)
POTASSIUM SERPL-SCNC: 4.7 MMOL/L (ref 3.5–5.3)
PROT SERPL-MCNC: 7.1 G/DL (ref 6.4–8.2)
RBC # BLD AUTO: 4.83 MILLION/UL (ref 3.81–5.12)
SODIUM SERPL-SCNC: 140 MMOL/L (ref 136–145)
TSH SERPL DL<=0.05 MIU/L-ACNC: 1.82 UIU/ML (ref 0.45–4.5)
WBC # BLD AUTO: 5.95 THOUSAND/UL (ref 4.31–10.16)

## 2022-04-26 PROCEDURE — 86140 C-REACTIVE PROTEIN: CPT

## 2022-04-26 PROCEDURE — 85025 COMPLETE CBC W/AUTO DIFF WBC: CPT

## 2022-04-26 PROCEDURE — 84443 ASSAY THYROID STIM HORMONE: CPT

## 2022-04-26 PROCEDURE — 36415 COLL VENOUS BLD VENIPUNCTURE: CPT

## 2022-04-26 PROCEDURE — 83735 ASSAY OF MAGNESIUM: CPT

## 2022-04-26 PROCEDURE — 82550 ASSAY OF CK (CPK): CPT

## 2022-04-26 PROCEDURE — 80053 COMPREHEN METABOLIC PANEL: CPT

## 2022-04-26 PROCEDURE — 86038 ANTINUCLEAR ANTIBODIES: CPT

## 2022-04-26 PROCEDURE — 85652 RBC SED RATE AUTOMATED: CPT

## 2022-04-27 LAB — ANA TITR SER IF: NEGATIVE {TITER}

## 2022-05-10 ENCOUNTER — HOSPITAL ENCOUNTER (OUTPATIENT)
Dept: RADIOLOGY | Facility: MEDICAL CENTER | Age: 37
Discharge: HOME | End: 2022-05-10
Payer: COMMERCIAL

## 2022-05-10 DIAGNOSIS — G89.29 POST-COVID CHRONIC MUSCLE PAIN: ICD-10-CM

## 2022-05-10 DIAGNOSIS — U09.9 POST-COVID CHRONIC MUSCLE PAIN: ICD-10-CM

## 2022-05-10 DIAGNOSIS — M79.604 BILATERAL LEG PAIN: ICD-10-CM

## 2022-05-10 DIAGNOSIS — M79.10 POST-COVID CHRONIC MUSCLE PAIN: ICD-10-CM

## 2022-05-10 DIAGNOSIS — U09.9 POST-COVID-19 SYNDROME: ICD-10-CM

## 2022-05-10 DIAGNOSIS — M79.605 BILATERAL LEG PAIN: ICD-10-CM

## 2022-05-10 PROCEDURE — 93970 EXTREMITY STUDY: CPT

## 2022-05-10 PROCEDURE — 93970 EXTREMITY STUDY: CPT | Performed by: SURGERY

## 2022-06-01 ENCOUNTER — OFFICE VISIT (OUTPATIENT)
Dept: FAMILY MEDICINE CLINIC | Facility: CLINIC | Age: 37
End: 2022-06-01
Payer: COMMERCIAL

## 2022-06-01 VITALS
WEIGHT: 223.6 LBS | TEMPERATURE: 97.6 F | HEART RATE: 64 BPM | SYSTOLIC BLOOD PRESSURE: 112 MMHG | BODY MASS INDEX: 41.15 KG/M2 | HEIGHT: 62 IN | DIASTOLIC BLOOD PRESSURE: 74 MMHG | OXYGEN SATURATION: 98 %

## 2022-06-01 DIAGNOSIS — M79.10 POST-COVID CHRONIC MUSCLE PAIN: ICD-10-CM

## 2022-06-01 DIAGNOSIS — F50.89 PICA IN ADULTS: Primary | ICD-10-CM

## 2022-06-01 DIAGNOSIS — U09.9 POST-COVID CHRONIC MUSCLE PAIN: ICD-10-CM

## 2022-06-01 DIAGNOSIS — M79.7 FIBROMYALGIA AFFECTING LOWER LEG: ICD-10-CM

## 2022-06-01 DIAGNOSIS — R53.82 CHRONIC FATIGUE SYNDROME WITH FIBROMYALGIA: ICD-10-CM

## 2022-06-01 DIAGNOSIS — M79.7 CHRONIC FATIGUE SYNDROME WITH FIBROMYALGIA: ICD-10-CM

## 2022-06-01 DIAGNOSIS — G89.29 POST-COVID CHRONIC MUSCLE PAIN: ICD-10-CM

## 2022-06-01 PROCEDURE — 99214 OFFICE O/P EST MOD 30 MIN: CPT | Performed by: FAMILY MEDICINE

## 2022-06-01 RX ORDER — EPINEPHRINE 0.3 MG/.3ML
INJECTION SUBCUTANEOUS
COMMUNITY
Start: 2022-03-10

## 2022-06-01 RX ORDER — DULOXETIN HYDROCHLORIDE 20 MG/1
20 CAPSULE, DELAYED RELEASE ORAL DAILY
Qty: 30 CAPSULE | Refills: 1 | Status: SHIPPED | OUTPATIENT
Start: 2022-06-01 | End: 2022-06-30 | Stop reason: SINTOL

## 2022-06-01 NOTE — PROGRESS NOTES
Assessment/Plan:    1  Pica in adults  -     CBC and differential; Future  -     Ferritin; Future  -     Iron; Future  -     Basic metabolic panel; Future    2  Post-COVID chronic muscle pain  -     DULoxetine (CYMBALTA) 20 mg capsule; Take 1 capsule (20 mg total) by mouth daily  -     Vitamin D 25 hydroxy; Future    3  Fibromyalgia affecting lower leg    4  Chronic fatigue syndrome with fibromyalgia  -     DULoxetine (CYMBALTA) 20 mg capsule; Take 1 capsule (20 mg total) by mouth daily  -     Vitamin D 25 hydroxy; Future        There are no Patient Instructions on file for this visit  No follow-ups on file  Subjective:      Patient ID: Letha Loo is a 42 y o  female  Chief Complaint   Patient presents with    Knee Pain       Here for f/u  Post covid sx, Notes hair loss, thinning  Leg pains b/l  Emotional  Notes h/o post partum when her dtr was born  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  Pt just got a treadmill, worried about muscle atrophy after COVID, but still with signif fatigue, legs feel heavy  2 sets of b/l dopplers both neg for DVT  Still seeing discolorations on the back of each leg  Sleeping more  No fam h/o fibromyalgia or MS  Maternal cousin, who also had COVID is having similar pains in the legs  Notes new leg swelling  Gaining weight  Has been eating a lot of peanut butter  Did the same thing when she was going through her post partum depression  Almost like an addiction  Also craving lemonheads  Doesn't like lemon or peanut butter in her regular diet, has had these unusual habits for the last month  She did start taking fish oil since last week  Still falling asleep with a weighted heating pad for relief, sleep quality interrupted by pain       Knee Pain         The following portions of the patient's history were reviewed and updated as appropriate: allergies, current medications, past family history, past medical history, past social history, past surgical history and problem list     Review of Systems   Constitutional: Negative for fatigue and fever  HENT: Negative for congestion  Eyes: Negative for visual disturbance  Respiratory: Negative for chest tightness and shortness of breath  Cardiovascular: Negative for chest pain and palpitations  Gastrointestinal: Negative for abdominal pain  Genitourinary: Negative for difficulty urinating  Musculoskeletal: Positive for myalgias  Negative for arthralgias  Neurological: Negative for headaches  Hematological: Does not bruise/bleed easily  Current Outpatient Medications   Medication Sig Dispense Refill    acetaminophen (TYLENOL) 500 mg tablet Take 1 tablet (500 mg total) by mouth every 8 (eight) hours 90 tablet 1    Advair -21 MCG/ACT inhaler       albuterol (2 5 mg/3 mL) 0 083 % nebulizer solution Inhale      albuterol (PROVENTIL HFA,VENTOLIN HFA) 90 mcg/act inhaler Inhale 2 puffs every 6 (six) hours as needed for wheezing      cimetidine (TAGAMET) 400 mg tablet Take 400 mg by mouth 2 (two) times a day      cyclobenzaprine (FLEXERIL) 10 mg tablet Take 1 tablet (10 mg total) by mouth 3 (three) times a day as needed for muscle spasms 30 tablet 0    DULoxetine (CYMBALTA) 20 mg capsule Take 1 capsule (20 mg total) by mouth daily 30 capsule 1    Multiple Vitamins-Minerals (multivitamin with minerals) tablet Take 1 tablet by mouth daily      phentermine (ADIPEX-P) 37 5 MG tablet Take 1 tablet (37 5 mg total) by mouth daily as needed (appetite control) 30 tablet 0    EPINEPHrine (EPIPEN) 0 3 mg/0 3 mL SOAJ INJECT 1 PEN IMMEDIATELY FOR ALLERGIC REACTION FOR 30 DAYS       No current facility-administered medications for this visit         Objective:    /74 (BP Location: Right arm, Patient Position: Sitting, Cuff Size: Standard)   Pulse 64   Temp 97 6 °F (36 4 °C)   Ht 5' 2" (1 575 m)   Wt 101 kg (223 lb 9 6 oz)   LMP 05/28/2022   SpO2 98%   BMI 40 90 kg/m² Physical Exam  Vitals reviewed  Constitutional:       Appearance: Normal appearance  She is well-developed  Cardiovascular:      Rate and Rhythm: Normal rate  Pulmonary:      Effort: Pulmonary effort is normal    Skin:     General: Skin is warm  Neurological:      Mental Status: She is alert and oriented to person, place, and time  Psychiatric:         Mood and Affect: Mood normal          Behavior: Behavior normal          Thought Content:  Thought content normal          Judgment: Judgment normal                 Nilesh Najera MD

## 2022-06-02 ENCOUNTER — APPOINTMENT (OUTPATIENT)
Dept: LAB | Facility: MEDICAL CENTER | Age: 37
End: 2022-06-02
Payer: COMMERCIAL

## 2022-06-02 ENCOUNTER — OFFICE VISIT (OUTPATIENT)
Dept: OBGYN CLINIC | Facility: CLINIC | Age: 37
End: 2022-06-02
Payer: COMMERCIAL

## 2022-06-02 VITALS
SYSTOLIC BLOOD PRESSURE: 124 MMHG | HEIGHT: 62 IN | DIASTOLIC BLOOD PRESSURE: 82 MMHG | BODY MASS INDEX: 41.04 KG/M2 | WEIGHT: 223 LBS

## 2022-06-02 DIAGNOSIS — F50.89 PICA IN ADULTS: ICD-10-CM

## 2022-06-02 DIAGNOSIS — N89.8 ITCHING OF VAGINA: Primary | ICD-10-CM

## 2022-06-02 DIAGNOSIS — M79.10 POST-COVID CHRONIC MUSCLE PAIN: ICD-10-CM

## 2022-06-02 DIAGNOSIS — R53.82 CHRONIC FATIGUE SYNDROME WITH FIBROMYALGIA: ICD-10-CM

## 2022-06-02 DIAGNOSIS — U09.9 POST-COVID CHRONIC MUSCLE PAIN: ICD-10-CM

## 2022-06-02 DIAGNOSIS — B37.49 GENITAL CANDIDIASIS: ICD-10-CM

## 2022-06-02 DIAGNOSIS — N89.8 VAGINAL ODOR: ICD-10-CM

## 2022-06-02 DIAGNOSIS — Z11.3 SCREENING FOR STDS (SEXUALLY TRANSMITTED DISEASES): ICD-10-CM

## 2022-06-02 DIAGNOSIS — M79.7 CHRONIC FATIGUE SYNDROME WITH FIBROMYALGIA: ICD-10-CM

## 2022-06-02 DIAGNOSIS — N89.8 VAGINAL DISCHARGE: ICD-10-CM

## 2022-06-02 DIAGNOSIS — G89.29 POST-COVID CHRONIC MUSCLE PAIN: ICD-10-CM

## 2022-06-02 DIAGNOSIS — N76.0 ACUTE VAGINITIS: ICD-10-CM

## 2022-06-02 LAB
25(OH)D3 SERPL-MCNC: 24.9 NG/ML (ref 30–100)
ANION GAP SERPL CALCULATED.3IONS-SCNC: 5 MMOL/L (ref 4–13)
BASOPHILS # BLD AUTO: 0.03 THOUSANDS/ΜL (ref 0–0.1)
BASOPHILS NFR BLD AUTO: 1 % (ref 0–1)
BUN SERPL-MCNC: 13 MG/DL (ref 5–25)
CALCIUM SERPL-MCNC: 9.8 MG/DL (ref 8.3–10.1)
CHLORIDE SERPL-SCNC: 105 MMOL/L (ref 100–108)
CO2 SERPL-SCNC: 28 MMOL/L (ref 21–32)
CREAT SERPL-MCNC: 1 MG/DL (ref 0.6–1.3)
EOSINOPHIL # BLD AUTO: 0.2 THOUSAND/ΜL (ref 0–0.61)
EOSINOPHIL NFR BLD AUTO: 4 % (ref 0–6)
ERYTHROCYTE [DISTWIDTH] IN BLOOD BY AUTOMATED COUNT: 13.3 % (ref 11.6–15.1)
FERRITIN SERPL-MCNC: 33 NG/ML (ref 8–388)
GFR SERPL CREATININE-BSD FRML MDRD: 71 ML/MIN/1.73SQ M
GLUCOSE SERPL-MCNC: 88 MG/DL (ref 65–140)
HCT VFR BLD AUTO: 40.9 % (ref 34.8–46.1)
HGB BLD-MCNC: 12.6 G/DL (ref 11.5–15.4)
IMM GRANULOCYTES # BLD AUTO: 0 THOUSAND/UL (ref 0–0.2)
IMM GRANULOCYTES NFR BLD AUTO: 0 % (ref 0–2)
IRON SERPL-MCNC: 65 UG/DL (ref 50–170)
LYMPHOCYTES # BLD AUTO: 1.48 THOUSANDS/ΜL (ref 0.6–4.47)
LYMPHOCYTES NFR BLD AUTO: 29 % (ref 14–44)
MCH RBC QN AUTO: 25.5 PG (ref 26.8–34.3)
MCHC RBC AUTO-ENTMCNC: 30.8 G/DL (ref 31.4–37.4)
MCV RBC AUTO: 83 FL (ref 82–98)
MONOCYTES # BLD AUTO: 0.49 THOUSAND/ΜL (ref 0.17–1.22)
MONOCYTES NFR BLD AUTO: 10 % (ref 4–12)
NEUTROPHILS # BLD AUTO: 2.89 THOUSANDS/ΜL (ref 1.85–7.62)
NEUTS SEG NFR BLD AUTO: 56 % (ref 43–75)
NRBC BLD AUTO-RTO: 0 /100 WBCS
PLATELET # BLD AUTO: 244 THOUSANDS/UL (ref 149–390)
PMV BLD AUTO: 11.7 FL (ref 8.9–12.7)
POTASSIUM SERPL-SCNC: 4.4 MMOL/L (ref 3.5–5.3)
RBC # BLD AUTO: 4.94 MILLION/UL (ref 3.81–5.12)
SODIUM SERPL-SCNC: 138 MMOL/L (ref 136–145)
WBC # BLD AUTO: 5.09 THOUSAND/UL (ref 4.31–10.16)

## 2022-06-02 PROCEDURE — 87070 CULTURE OTHR SPECIMN AEROBIC: CPT | Performed by: PHYSICIAN ASSISTANT

## 2022-06-02 PROCEDURE — 80048 BASIC METABOLIC PNL TOTAL CA: CPT

## 2022-06-02 PROCEDURE — 82306 VITAMIN D 25 HYDROXY: CPT

## 2022-06-02 PROCEDURE — 83540 ASSAY OF IRON: CPT

## 2022-06-02 PROCEDURE — 36415 COLL VENOUS BLD VENIPUNCTURE: CPT

## 2022-06-02 PROCEDURE — 82728 ASSAY OF FERRITIN: CPT

## 2022-06-02 PROCEDURE — 99203 OFFICE O/P NEW LOW 30 MIN: CPT | Performed by: PHYSICIAN ASSISTANT

## 2022-06-02 PROCEDURE — 85025 COMPLETE CBC W/AUTO DIFF WBC: CPT

## 2022-06-02 PROCEDURE — 87147 CULTURE TYPE IMMUNOLOGIC: CPT | Performed by: PHYSICIAN ASSISTANT

## 2022-06-03 NOTE — PROGRESS NOTES
Assessment/Plan:    No problem-specific Assessment & Plan notes found for this encounter  Diagnoses and all orders for this visit:    Itching of vagina  -     Genital Comprehensive Culture    Vaginal discharge  -     Genital Comprehensive Culture    Vaginal odor    Acute vaginitis  -     Genital Comprehensive Culture    Genital candidiasis  -     Genital Comprehensive Culture    Screening for STDs (sexually transmitted diseases)  -     Genital Comprehensive Culture          Subjective:      Patient ID: Karissa Fields is a 42 y o  female  Pt presents as a new patient problem  She complains of vaginal discharge, itching, odor    She has regular periods  Bowel and bladder are ok  She denies any change in meds, products, partner    She does have h/o yeast and BV      Culture done  Products discussed  Daily probiotic and witch hazel    B, D, nesha for libido discussed as well      The following portions of the patient's history were reviewed and updated as appropriate: allergies, current medications, past family history, past medical history, past social history, past surgical history and problem list     Review of Systems   Constitutional: Negative for chills, fever and unexpected weight change  Gastrointestinal: Negative for abdominal pain, blood in stool, constipation and diarrhea  Genitourinary: Positive for vaginal discharge and vaginal pain (itching)  Negative for dysuria and vaginal bleeding  Objective:      /82   Ht 5' 2" (1 575 m)   Wt 101 kg (223 lb)   LMP 05/28/2022 (Exact Date)   BMI 40 79 kg/m²          Physical Exam  Vitals and nursing note reviewed  Constitutional:       Appearance: She is well-developed  Genitourinary:     Exam position: Supine  Labia:         Right: No rash or lesion  Left: No rash or lesion  Vagina: Normal       Cervix: No cervical motion tenderness, discharge or friability     Lymphadenopathy:      Lower Body: No right inguinal adenopathy  No left inguinal adenopathy

## 2022-06-06 DIAGNOSIS — A49.1 GROUP B STREPTOCOCCAL INFECTION: Primary | ICD-10-CM

## 2022-06-06 DIAGNOSIS — B37.9 YEAST INFECTION: Primary | ICD-10-CM

## 2022-06-06 RX ORDER — AMPICILLIN 500 MG/1
500 CAPSULE ORAL 2 TIMES DAILY
Qty: 14 CAPSULE | Refills: 0 | Status: SHIPPED | OUTPATIENT
Start: 2022-06-06 | End: 2022-06-13

## 2022-06-06 RX ORDER — FLUCONAZOLE 150 MG/1
150 TABLET ORAL ONCE
Qty: 1 TABLET | Refills: 0 | Status: SHIPPED | OUTPATIENT
Start: 2022-06-06 | End: 2022-06-06

## 2022-06-06 NOTE — TELEPHONE ENCOUNTER
The patient was advised of culture results  Please sign off on ampicillin  The patient also wanted to know if you can give her diflucan to prevent her from developing a yeast infection from the antibiotic

## 2022-06-07 LAB
BACTERIA GENITAL AEROBE CULT: ABNORMAL
BACTERIA GENITAL AEROBE CULT: ABNORMAL

## 2022-06-30 ENCOUNTER — OFFICE VISIT (OUTPATIENT)
Dept: FAMILY MEDICINE CLINIC | Facility: CLINIC | Age: 37
End: 2022-06-30
Payer: COMMERCIAL

## 2022-06-30 VITALS
SYSTOLIC BLOOD PRESSURE: 122 MMHG | WEIGHT: 229 LBS | TEMPERATURE: 97.8 F | DIASTOLIC BLOOD PRESSURE: 70 MMHG | OXYGEN SATURATION: 98 % | HEIGHT: 62 IN | BODY MASS INDEX: 42.14 KG/M2 | HEART RATE: 68 BPM

## 2022-06-30 DIAGNOSIS — M62.838 MUSCLE SPASM OF LEFT LOWER EXTREMITY: ICD-10-CM

## 2022-06-30 DIAGNOSIS — M79.7 FIBROMYALGIA AFFECTING LOWER LEG: Primary | ICD-10-CM

## 2022-06-30 DIAGNOSIS — E66.01 OBESITY, CLASS III, BMI 40-49.9 (MORBID OBESITY) (HCC): ICD-10-CM

## 2022-06-30 DIAGNOSIS — M79.2 NERVE PAIN: ICD-10-CM

## 2022-06-30 PROCEDURE — 99214 OFFICE O/P EST MOD 30 MIN: CPT | Performed by: FAMILY MEDICINE

## 2022-06-30 RX ORDER — PHENTERMINE HYDROCHLORIDE 37.5 MG/1
37.5 TABLET ORAL DAILY PRN
Qty: 30 TABLET | Refills: 0 | Status: SHIPPED | OUTPATIENT
Start: 2022-06-30

## 2022-06-30 RX ORDER — GABAPENTIN 100 MG/1
100 CAPSULE ORAL 3 TIMES DAILY PRN
Qty: 90 CAPSULE | Refills: 0 | Status: SHIPPED | OUTPATIENT
Start: 2022-06-30

## 2022-06-30 RX ORDER — CYCLOBENZAPRINE HCL 10 MG
10 TABLET ORAL 3 TIMES DAILY PRN
Qty: 30 TABLET | Refills: 1 | Status: SHIPPED | OUTPATIENT
Start: 2022-06-30

## 2022-06-30 NOTE — PATIENT INSTRUCTIONS
Will try meds for symptoms, consider seeing [place] or [place] in [facility] for second opinion, [place] [facility]

## 2022-06-30 NOTE — PROGRESS NOTES
Assessment/Plan:    1  Fibromyalgia affecting lower leg  -     gabapentin (Neurontin) 100 mg capsule; Take 1 capsule (100 mg total) by mouth 3 (three) times a day as needed (nerve pain)    2  Muscle spasm of left lower extremity  -     cyclobenzaprine (FLEXERIL) 10 mg tablet; Take 1 tablet (10 mg total) by mouth 3 (three) times a day as needed for muscle spasms  -     gabapentin (Neurontin) 100 mg capsule; Take 1 capsule (100 mg total) by mouth 3 (three) times a day as needed (nerve pain)    3  Obesity, Class III, BMI 40-49 9 (morbid obesity) (HCC)  Comments:  trial of phentermine, consider saxenda or ozempic  Orders:  -     phentermine (ADIPEX-P) 37 5 MG tablet; Take 1 tablet (37 5 mg total) by mouth daily as needed (appetite control)    4  Nerve pain  -     gabapentin (Neurontin) 100 mg capsule; Take 1 capsule (100 mg total) by mouth 3 (three) times a day as needed (nerve pain)        Patient Instructions   Will try meds for symptoms, consider seeing [place] or [facility] in [facility] for second opinion, [place] [employer]      Return if symptoms worsen or fail to improve  Subjective:      Patient ID: Letha Loo is a 42 y o  female  Chief Complaint   Patient presents with    Follow-up       Here for f/u  Couldn't tolerate the cymbalta  Made her on edge, anxious, irritable  Resolved when she stopped the med  No change in pain  Legs are swollen even if she's not at work all day  burining pain behind the knee cap, tightness in the legs  Started one a day vitamins, vit D 5000 IU daily  Pt notes bruising since starting the fish oil  B complex  Leg tightness is daily, almost constant, burning pain is once a week  Hair loss, erratic menses  Wants to restart phentermine, with lack of activity she has gained weight back  Goal weight is 180#         The following portions of the patient's history were reviewed and updated as appropriate: allergies, current medications, past family history, past medical history, past social history, past surgical history and problem list     Review of Systems   Constitutional: Negative for fatigue and fever  HENT: Negative for congestion  Eyes: Negative for visual disturbance  Respiratory: Negative for chest tightness and shortness of breath  Cardiovascular: Negative for chest pain and palpitations  Gastrointestinal: Negative for abdominal pain  Genitourinary: Negative for difficulty urinating  Musculoskeletal: Positive for myalgias  Negative for arthralgias  Neurological: Positive for numbness  Negative for headaches  Hematological: Does not bruise/bleed easily  Current Outpatient Medications   Medication Sig Dispense Refill    acetaminophen (TYLENOL) 500 mg tablet Take 1 tablet (500 mg total) by mouth every 8 (eight) hours 90 tablet 1    Advair -21 MCG/ACT inhaler       albuterol (2 5 mg/3 mL) 0 083 % nebulizer solution Inhale      albuterol (PROVENTIL HFA,VENTOLIN HFA) 90 mcg/act inhaler Inhale 2 puffs every 6 (six) hours as needed for wheezing      cimetidine (TAGAMET) 400 mg tablet Take 400 mg by mouth 2 (two) times a day      cyclobenzaprine (FLEXERIL) 10 mg tablet Take 1 tablet (10 mg total) by mouth 3 (three) times a day as needed for muscle spasms 30 tablet 01    EPINEPHrine (EPIPEN) 0 3 mg/0 3 mL SOAJ INJECT 1 PEN IMMEDIATELY FOR ALLERGIC REACTION FOR 30 DAYS      gabapentin (Neurontin) 100 mg capsule Take 1 capsule (100 mg total) by mouth 3 (three) times a day as needed (nerve pain) 90 capsule 0    Multiple Vitamins-Minerals (multivitamin with minerals) tablet Take 1 tablet by mouth daily      phentermine (ADIPEX-P) 37 5 MG tablet Take 1 tablet (37 5 mg total) by mouth daily as needed (appetite control) 30 tablet 0     No current facility-administered medications for this visit         Objective:    /70 (BP Location: Right arm, Patient Position: Sitting, Cuff Size: Standard)   Pulse 68   Temp 97 8 °F (36 6 °C)   Ht 5' 2" (1 575 m)   Wt 104 kg (229 lb)   LMP 06/23/2022   SpO2 98%   BMI 41 88 kg/m²        Physical Exam  Vitals reviewed  Constitutional:       Appearance: Normal appearance  She is well-developed  Cardiovascular:      Rate and Rhythm: Normal rate  Pulmonary:      Effort: Pulmonary effort is normal    Skin:     General: Skin is warm  Neurological:      Mental Status: She is alert and oriented to person, place, and time  Psychiatric:         Mood and Affect: Mood normal          Behavior: Behavior normal          Thought Content:  Thought content normal          Judgment: Judgment normal                 Bill Khan MD

## 2022-09-28 DIAGNOSIS — L67.9 HAIR CHANGES: Primary | ICD-10-CM

## 2022-09-28 DIAGNOSIS — U09.9 POST-COVID-19 SYNDROME: ICD-10-CM

## 2022-09-28 DIAGNOSIS — M62.838 MUSCLE SPASM OF LEFT LOWER EXTREMITY: ICD-10-CM

## 2022-09-30 ENCOUNTER — APPOINTMENT (OUTPATIENT)
Dept: LAB | Facility: MEDICAL CENTER | Age: 37
End: 2022-09-30
Payer: COMMERCIAL

## 2022-09-30 DIAGNOSIS — M62.838 MUSCLE SPASM OF LEFT LOWER EXTREMITY: ICD-10-CM

## 2022-09-30 DIAGNOSIS — L67.9 HAIR CHANGES: ICD-10-CM

## 2022-09-30 DIAGNOSIS — U09.9 POST-COVID-19 SYNDROME: ICD-10-CM

## 2022-09-30 LAB
25(OH)D3 SERPL-MCNC: 28.4 NG/ML (ref 30–100)
ERYTHROCYTE [DISTWIDTH] IN BLOOD BY AUTOMATED COUNT: 13.2 % (ref 11.6–15.1)
FERRITIN SERPL-MCNC: 26 NG/ML (ref 8–388)
HCT VFR BLD AUTO: 39.1 % (ref 34.8–46.1)
HGB BLD-MCNC: 12.3 G/DL (ref 11.5–15.4)
MAGNESIUM SERPL-MCNC: 2.2 MG/DL (ref 1.6–2.6)
MCH RBC QN AUTO: 26 PG (ref 26.8–34.3)
MCHC RBC AUTO-ENTMCNC: 31.5 G/DL (ref 31.4–37.4)
MCV RBC AUTO: 83 FL (ref 82–98)
PLATELET # BLD AUTO: 259 THOUSANDS/UL (ref 149–390)
PMV BLD AUTO: 12.6 FL (ref 8.9–12.7)
RBC # BLD AUTO: 4.73 MILLION/UL (ref 3.81–5.12)
TSH SERPL DL<=0.05 MIU/L-ACNC: 1.46 UIU/ML (ref 0.45–4.5)
VIT B12 SERPL-MCNC: 492 PG/ML (ref 100–900)
WBC # BLD AUTO: 5.2 THOUSAND/UL (ref 4.31–10.16)

## 2022-09-30 PROCEDURE — 36415 COLL VENOUS BLD VENIPUNCTURE: CPT

## 2022-09-30 PROCEDURE — 82607 VITAMIN B-12: CPT

## 2022-09-30 PROCEDURE — 83735 ASSAY OF MAGNESIUM: CPT

## 2022-09-30 PROCEDURE — 84443 ASSAY THYROID STIM HORMONE: CPT

## 2022-09-30 PROCEDURE — 84590 ASSAY OF VITAMIN A: CPT

## 2022-09-30 PROCEDURE — 85027 COMPLETE CBC AUTOMATED: CPT

## 2022-09-30 PROCEDURE — 82672 ASSAY OF ESTROGEN: CPT

## 2022-09-30 PROCEDURE — 82728 ASSAY OF FERRITIN: CPT

## 2022-09-30 PROCEDURE — 82306 VITAMIN D 25 HYDROXY: CPT

## 2022-10-03 LAB — VIT A SERPL-MCNC: 34.6 UG/DL (ref 18.9–57.3)

## 2022-10-05 LAB — ESTROGEN SERPL-MCNC: 124 PG/ML

## 2022-11-02 ENCOUNTER — TELEPHONE (OUTPATIENT)
Dept: FAMILY MEDICINE CLINIC | Facility: CLINIC | Age: 37
End: 2022-11-02

## 2022-11-02 NOTE — TELEPHONE ENCOUNTER
Pt has been experiencing leg weakness in addition to pain  One of her coworkers suggested seeing infectious disease  She would like to know if Dr Guillermo Tidwell would be willing to place a referral for ID

## 2022-11-03 ENCOUNTER — TELEPHONE (OUTPATIENT)
Dept: INFECTIOUS DISEASES | Facility: CLINIC | Age: 37
End: 2022-11-03

## 2022-11-03 DIAGNOSIS — U09.9 POST-COVID-19 SYNDROME: Primary | ICD-10-CM

## 2022-11-03 NOTE — TELEPHONE ENCOUNTER
CB: [phone]    Patient calls that she has a referral to be seen and her PCP has entered it  Looks like PCP is working patient up for some symptoms that she is experiencing

## 2022-11-17 ENCOUNTER — TELEPHONE (OUTPATIENT)
Dept: FAMILY MEDICINE CLINIC | Facility: CLINIC | Age: 37
End: 2022-11-17

## 2022-11-17 NOTE — TELEPHONE ENCOUNTER
----- Message from Kanwal Kaiser LPN sent at [id number]/8/9640  9:18 AM EST -----  I apologize that is what were told beforehand  I will ask the ID provider in the office if he has any more suggestions    ----- Message -----  From: Karsten Roman MD  Sent: 11/9/2022   9:08 AM EST  To: Golda Alpha, LPN Severo Ramp,      The patient contacted Dr Sarah Alexander office for post COVID as cece had recommended, they told her they do not treat long covid or anything related to this  Can you please give me some guidance as to what to tell the patient?     Thanks,  Dr Benita Duran

## 2023-02-09 ENCOUNTER — OFFICE VISIT (OUTPATIENT)
Dept: FAMILY MEDICINE CLINIC | Facility: CLINIC | Age: 38
End: 2023-02-09

## 2023-02-09 VITALS
DIASTOLIC BLOOD PRESSURE: 60 MMHG | SYSTOLIC BLOOD PRESSURE: 110 MMHG | TEMPERATURE: 97.6 F | HEART RATE: 74 BPM | WEIGHT: 233.2 LBS | BODY MASS INDEX: 42.91 KG/M2 | HEIGHT: 62 IN | OXYGEN SATURATION: 98 %

## 2023-02-09 DIAGNOSIS — E66.01 OBESITY, CLASS III, BMI 40-49.9 (MORBID OBESITY) (HCC): Primary | ICD-10-CM

## 2023-02-09 NOTE — PATIENT INSTRUCTIONS
Consider functional medicine, nutritionist, change exercise, add more weights, more variety in exercise  Consider intermittent fasting

## 2023-02-09 NOTE — PROGRESS NOTES
Assessment/Plan:    1  Obesity, Class III, BMI 40-49 9 (morbid obesity) ([facility] [address] )        Patient Instructions   Consider functional medicine, nutritionist, change exercise, add more weights, more variety in exercise  Consider intermittent fasting      Return if symptoms worsen or fail to improve  Subjective:      Patient ID: Jennifer Singh is a 42 y o  female  Chief Complaint   Patient presents with   • Weight gain     Despite diet, exercise, & increasing water intake, pt isn't losing       Here for weight gain  Weight down to 233#, (217# with COVID), goal weight 190#  Taking in up to [address], walking, portion control  Only drinks water  Using splenda for sweetener  Trying to increase protein  She was on phentermine but she wasn't eating enough to make it helpful    She has seen nutritionst in the past at [address]  Family is overweight as well, most even heavier than her  Pt does not desire breast reduction, nor does she want to have bariatric surgery  She eats small amounts throughout the day  Last year A1C was normal, lipids ok  The following portions of the patient's history were reviewed and updated as appropriate: allergies, current medications, past family history, past medical history, past social history, past surgical history and problem list     Review of Systems   Constitutional: Positive for unexpected weight change  Negative for fatigue and fever  HENT: Negative for congestion  Eyes: Negative for visual disturbance  Respiratory: Negative for chest tightness and shortness of breath  Cardiovascular: Negative for chest pain and palpitations  Gastrointestinal: Negative for abdominal pain  Genitourinary: Negative for difficulty urinating  Musculoskeletal: Negative for arthralgias  Neurological: Negative for headaches  Hematological: Does not bruise/bleed easily           Current Outpatient Medications   Medication Sig Dispense Refill   • acetaminophen (TYLENOL) 500 mg tablet Take 1 tablet (500 mg total) by mouth every 8 (eight) hours 90 tablet 1   • Advair -21 MCG/ACT inhaler      • albuterol (2 5 mg/3 mL) 0 083 % nebulizer solution Inhale     • albuterol (PROVENTIL HFA,VENTOLIN HFA) 90 mcg/act inhaler Inhale 2 puffs every 6 (six) hours as needed for wheezing     • cimetidine (TAGAMET) 400 mg tablet Take 400 mg by mouth 2 (two) times a day     • cyclobenzaprine (FLEXERIL) 10 mg tablet Take 1 tablet (10 mg total) by mouth 3 (three) times a day as needed for muscle spasms 30 tablet 01   • EPINEPHrine (EPIPEN) 0 3 mg/0 3 mL SOAJ INJECT 1 PEN IMMEDIATELY FOR ALLERGIC REACTION FOR 30 DAYS     • Multiple Vitamins-Minerals (multivitamin with minerals) tablet Take 1 tablet by mouth daily     • gabapentin (Neurontin) 100 mg capsule Take 1 capsule (100 mg total) by mouth 3 (three) times a day as needed (nerve pain) 90 capsule 0   • phentermine (ADIPEX-P) 37 5 MG tablet Take 1 tablet (37 5 mg total) by mouth daily as needed (appetite control) 30 tablet 0     No current facility-administered medications for this visit  Objective:    /60 (BP Location: Right arm, Patient Position: Sitting, Cuff Size: Large)   Pulse 74   Temp 97 6 °F (36 4 °C) (Temporal)   Ht 5' 2" (1 575 m)   Wt 106 kg (233 lb 3 2 oz)   LMP 01/29/2023 (Approximate)   SpO2 98%   BMI 42 65 kg/m²        Physical Exam  Vitals reviewed  Constitutional:       Appearance: Normal appearance  She is well-developed  Cardiovascular:      Rate and Rhythm: Normal rate  Pulmonary:      Effort: Pulmonary effort is normal    Skin:     General: Skin is warm  Neurological:      Mental Status: She is alert and oriented to person, place, and time  Psychiatric:         Mood and Affect: Mood normal          Behavior: Behavior normal          Thought Content:  Thought content normal          Judgment: Judgment normal                 Arabella Shin MD

## 2023-03-20 ENCOUNTER — OFFICE VISIT (OUTPATIENT)
Dept: URGENT CARE | Facility: MEDICAL CENTER | Age: 38
End: 2023-03-20

## 2023-03-20 VITALS
SYSTOLIC BLOOD PRESSURE: 143 MMHG | HEART RATE: 84 BPM | TEMPERATURE: 98.3 F | DIASTOLIC BLOOD PRESSURE: 66 MMHG | OXYGEN SATURATION: 99 % | RESPIRATION RATE: 18 BRPM | WEIGHT: 234 LBS | BODY MASS INDEX: 43.06 KG/M2 | HEIGHT: 62 IN

## 2023-03-20 DIAGNOSIS — J02.9 ACUTE PHARYNGITIS, UNSPECIFIED ETIOLOGY: Primary | ICD-10-CM

## 2023-03-20 DIAGNOSIS — J06.9 ACUTE URI: ICD-10-CM

## 2023-03-20 LAB — S PYO AG THROAT QL: NEGATIVE

## 2023-03-20 RX ORDER — OMALIZUMAB 150 MG/ML
INJECTION, SOLUTION SUBCUTANEOUS
COMMUNITY
Start: 2023-02-24

## 2023-03-20 NOTE — LETTER
March 20, 2023     Patient: Boni Marquis   YOB: 1985   Date of Visit: 3/20/2023       To Whom it May Concern:    Boni Marquis was seen in my clinic on 3/20/2023  She may return to work on 3/21/2023  If you have any questions or concerns, please don't hesitate to call           Sincerely,          Katarzyna Stern PA-C        CC: No Recipients

## 2023-03-20 NOTE — PROGRESS NOTES
[facility]        NAME: Malia Mejia is a 42 y o  female  : 1985    MRN: [id number]  DATE: 2023  TIME: 1:45 PM    Assessment and Plan   Acute pharyngitis, unspecified etiology [J02 9]  1  Acute pharyngitis, unspecified etiology  POCT rapid strepA    Throat culture      2  Acute URI              Patient Instructions       Follow up with PCP in 3-5 days  Proceed to  ER if symptoms worsen  Chief Complaint     Chief Complaint   Patient presents with   • Cold Like Symptoms     Pt  With cough, congestion, fever, chills, and headache that began 2 days ago  History of Present Illness       URI   This is a new problem  The current episode started in the past 7 days  The problem has been unchanged  There has been no fever  Associated symptoms include congestion, coughing, rhinorrhea, sneezing and a sore throat  Pertinent negatives include no abdominal pain, chest pain, headaches, nausea, plugged ear sensation, rash, sinus pain, vomiting or wheezing  She has tried inhaler use, decongestant and acetaminophen for the symptoms  The treatment provided mild relief  Review of Systems   Review of Systems   Constitutional: Negative for activity change and appetite change  HENT: Positive for congestion, rhinorrhea, sneezing and sore throat  Negative for sinus pain  Respiratory: Positive for cough  Negative for wheezing  Cardiovascular: Negative for chest pain  Gastrointestinal: Negative for abdominal pain, nausea and vomiting  Skin: Negative for rash  Neurological: Negative for headaches           Current Medications       Current Outpatient Medications:   •  acetaminophen (TYLENOL) 500 mg tablet, Take 1 tablet (500 mg total) by mouth every 8 (eight) hours, Disp: 90 tablet, Rfl: 1  •  Advair -21 MCG/ACT inhaler, , Disp: , Rfl:   •  albuterol (2 5 mg/3 mL) 0 083 % nebulizer solution, Inhale, Disp: , Rfl:   •  albuterol (PROVENTIL HFA,VENTOLIN HFA) 90 mcg/act inhaler, Inhale 2 puffs every 6 (six) hours as needed for wheezing, Disp: , Rfl:   •  cimetidine (TAGAMET) 400 mg tablet, Take 400 mg by mouth 2 (two) times a day, Disp: , Rfl:   •  Multiple Vitamins-Minerals (multivitamin with minerals) tablet, Take 1 tablet by mouth daily, Disp: , Rfl:   •  cyclobenzaprine (FLEXERIL) 10 mg tablet, Take 1 tablet (10 mg total) by mouth 3 (three) times a day as needed for muscle spasms (Patient not taking: Reported on 3/20/2023), Disp: 30 tablet, Rfl: 01  •  EPINEPHrine (EPIPEN) 0 3 mg/0 3 mL SOAJ, INJECT 1 PEN IMMEDIATELY FOR ALLERGIC REACTION FOR 30 DAYS, Disp: , Rfl:   •  gabapentin (Neurontin) 100 mg capsule, Take 1 capsule (100 mg total) by mouth 3 (three) times a day as needed (nerve pain), Disp: 90 capsule, Rfl: 0  •  phentermine (ADIPEX-P) 37 5 MG tablet, Take 1 tablet (37 5 mg total) by mouth daily as needed (appetite control), Disp: 30 tablet, Rfl: 0  •  Xolair subcutaneous injection, , Disp: , Rfl:     Current Allergies     Allergies as of 03/20/2023 - Reviewed 03/20/2023   Allergen Reaction Noted   • Lac bovis Angioedema 02/01/2019   • Shrimp extract allergy skin test - food allergy Angioedema 02/01/2019   • Banana - food allergy Other (See Comments) 03/22/2014   • Fruit extracts  12/17/2013   • Kiwi extract - food allergy Other (See Comments) 03/22/2014   • Latex Other (See Comments) 03/22/2014   • Pineapple - food allergy Hives 03/22/2014   • Pollen extract  06/13/2014            The following portions of the patient's history were reviewed and updated as appropriate: allergies, current medications, past family history, past medical history, past social history, past surgical history and problem list      Past Medical History:   Diagnosis Date   • Allergic    • Asthma    • GERD (gastroesophageal reflux disease)    • Morbid obesity with BMI of 40 0-44 9, adult ([address] )        Past Surgical History:   Procedure Laterality Date   • CHOLECYSTECTOMY     • TONSILECTOMY AND ADNOIDECTOMY • TUBAL LIGATION         Family History   Problem Relation Age of Onset   • Diabetes Mother    • Hypertension Mother    • Hypertension Father    • Cancer Father         melanoma    • Seizures Sister    • Diabetes Maternal Grandmother    • Hypertension Maternal Grandmother    • Cancer Maternal Grandmother    • Heart disease Maternal Grandmother    • Breast cancer Maternal Aunt    • Thyroid disease Neg Hx    • Stroke Neg Hx          Medications have been verified  Objective   /66   Pulse 84   Temp 98 3 °F (36 8 °C)   Resp 18   Ht 5' 2" (1 575 m)   Wt 106 kg (234 lb)   SpO2 99%   BMI 42 80 kg/m²        Physical Exam     Physical Exam  Vitals and nursing note reviewed  Constitutional:       General: She is not in acute distress  Appearance: She is well-developed  She is not ill-appearing  HENT:      Head: Normocephalic and atraumatic  Right Ear: Tympanic membrane, ear canal and external ear normal       Left Ear: Tympanic membrane, ear canal and external ear normal       Nose: No congestion or rhinorrhea  Mouth/Throat:      Mouth: Mucous membranes are moist  No oral lesions  Pharynx: Oropharynx is clear  Posterior oropharyngeal erythema present  No oropharyngeal exudate  Eyes:      General: No scleral icterus  Extraocular Movements: Extraocular movements intact  Right eye: Normal extraocular motion  Left eye: Normal extraocular motion  Conjunctiva/sclera: Conjunctivae normal       Pupils: Pupils are equal, round, and reactive to light  Cardiovascular:      Rate and Rhythm: Normal rate and regular rhythm  Pulses: Normal pulses  Heart sounds: Normal heart sounds  Pulmonary:      Effort: Pulmonary effort is normal  No respiratory distress  Breath sounds: Normal breath sounds  No wheezing, rhonchi or rales  Chest:      Chest wall: No tenderness  Abdominal:      General: Bowel sounds are normal       Palpations: Abdomen is soft   There is no mass  Tenderness: There is no abdominal tenderness  There is no guarding or rebound  Musculoskeletal:         General: Normal range of motion  Cervical back: Normal range of motion and neck supple  No tenderness  Lymphadenopathy:      Cervical: No cervical adenopathy  Skin:     General: Skin is warm and dry  Capillary Refill: Capillary refill takes less than 2 seconds  Findings: No lesion or rash  Neurological:      General: No focal deficit present  Mental Status: She is alert and oriented to person, place, and time  Coordination: Coordination normal       Gait: Gait normal    Psychiatric:         Mood and Affect: Mood normal          Behavior: Behavior normal          Thought Content:  Thought content normal          Judgment: Judgment normal

## 2023-03-20 NOTE — PATIENT INSTRUCTIONS
Pharyngitis   WHAT YOU NEED TO KNOW:   Pharyngitis, or sore throat, is inflammation of the tissues and structures in your pharynx (throat)  Pharyngitis is most often caused by bacteria or a virus  Other causes include smoking, allergies, or acid reflux  DISCHARGE INSTRUCTIONS:   Call your local emergency number (911 in the [address] [place] [address],[address]) if:   You have trouble breathing or swallowing because your throat is swollen  Return to the emergency department if:   You are drooling because it hurts too much to swallow  Your fever is higher than 102? F (39?C) or lasts longer than 3 days  You are confused  You taste blood  Call your doctor if:   Your throat pain gets worse  You have a painful lump in your throat that does not go away after 5 days  Your symptoms do not improve after 5 days  You have questions or concerns about your condition or care  Medicines:  Viral pharyngitis will go away on its own without treatment  Your sore throat should start to feel better in 3 to 5 days  You may need any of the following:  Antibiotics  treat a bacterial infection  NSAIDs  help decrease swelling and pain or fever  This medicine is available with or without a doctor's order  NSAIDs can cause stomach bleeding or kidney problems in certain people  If you take blood thinner medicine, always ask your healthcare provider if NSAIDs are safe for you  Always read the medicine label and follow directions  Acetaminophen  decreases pain and fever  It is available without a doctor's order  Ask how much to take and how often to take it  Follow directions  Read the labels of all other medicines you are using to see if they also contain acetaminophen, or ask your doctor or pharmacist  Acetaminophen can cause liver damage if not taken correctly  Take your medicine as directed  Contact your healthcare provider if you think your medicine is not helping or if you have side effects   Tell your provider if you are allergic to any medicine  Keep a list of the medicines, vitamins, and herbs you take  Include the amounts, and when and why you take them  Bring the list or the pill bottles to follow-up visits  Carry your medicine list with you in case of an emergency  Manage your symptoms:   Gargle salt water  Mix ¼ teaspoon salt in an 8 ounce glass of warm water and gargle  Do not swallow  Salt water may help decrease swelling in your throat  Drink liquids as directed  You may need to drink more liquids than usual  Liquids may help soothe your throat and prevent dehydration  Ask how much liquid to drink each day and which liquids are best for you  Use a cool mist humidifier  This will add moisture to the air and help decrease your cough  Soothe your throat  Cough drops, ice, soft foods, or popsicles may help decrease throat pain  Prevent the spread of pharyngitis:  Cover your mouth and nose when you cough or sneeze  Do not share food or drinks  Wash your hands often  Use soap and water  If soap and water are unavailable, use an alcohol-based hand   Follow up with your doctor as directed:  Write down your questions so you remember to ask them during your visits  © Copyright [employer] 2022 Information is for End User's use only and may not be sold, redistributed or otherwise used for commercial purposes  The above information is an  only  It is not intended as medical advice for individual conditions or treatments  Talk to your doctor, nurse or pharmacist before following any medical regimen to see if it is safe and effective for you

## 2023-03-22 LAB — BACTERIA THROAT CULT: NORMAL

## 2023-05-19 ENCOUNTER — APPOINTMENT (OUTPATIENT)
Dept: LAB | Facility: MEDICAL CENTER | Age: 38
End: 2023-05-19

## 2023-05-19 DIAGNOSIS — I10 ESSENTIAL HYPERTENSION, BENIGN: ICD-10-CM

## 2023-05-19 DIAGNOSIS — E78.2 MIXED HYPERLIPIDEMIA: ICD-10-CM

## 2023-05-19 LAB — B BURGDOR IGG+IGM SER-ACNC: 0.3 AI

## 2023-05-22 ENCOUNTER — OFFICE VISIT (OUTPATIENT)
Dept: URGENT CARE | Age: 38
End: 2023-05-22

## 2023-05-22 ENCOUNTER — APPOINTMENT (OUTPATIENT)
Dept: RADIOLOGY | Age: 38
End: 2023-05-22

## 2023-05-22 VITALS
DIASTOLIC BLOOD PRESSURE: 72 MMHG | TEMPERATURE: 98.7 F | HEART RATE: 102 BPM | SYSTOLIC BLOOD PRESSURE: 128 MMHG | RESPIRATION RATE: 18 BRPM | OXYGEN SATURATION: 98 %

## 2023-05-22 DIAGNOSIS — S69.82XA HYPEREXTENSION INJURY OF FINGER OF LEFT HAND: Primary | ICD-10-CM

## 2023-05-22 DIAGNOSIS — S69.82XA HYPEREXTENSION INJURY OF FINGER OF LEFT HAND: ICD-10-CM

## 2023-05-22 NOTE — PROGRESS NOTES
[facility]        NAME: Lesa Manley is a 34 y o  female  : 1985    MRN: [id number]  DATE: May 22, 2023  TIME: 4:57 PM    Assessment and Plan   Hyperextension injury of finger of left hand [S69 82XA]  1  Hyperextension injury of finger of left hand  XR hand 3+ vw left        Left hand xray: preliminary read by myself is negative for fracture  Finger splint applied for comfort  Will call patient with any abnormal findings  Patient Instructions   Splint for comfort   Ibuprofen every 6 hours for pain and swelling   Apply ice 20 min every 2-3 hours   Follow up with your PCP if no improvement  Follow up with PCP in 3-5 days  Proceed to  ER if symptoms worsen  Chief Complaint     Chief Complaint   Patient presents with   • Hand Pain     Banged left 1st finger on box this morning 2023         History of Present Illness       Patient is a 34year old female presenting with left hand injury  She hyperextended her left index finger this morning on her car door  She has swelling, ecchymosis, and limited ROM  She applied ice and too ibuprofen  She is RHD  Review of Systems   Review of Systems   Constitutional: Negative for activity change, chills and fever  Respiratory: Negative for cough and shortness of breath  Musculoskeletal: Positive for arthralgias and joint swelling  Negative for myalgias  Skin: Positive for color change  Negative for wound  Neurological: Negative for weakness and numbness           Current Medications       Current Outpatient Medications:   •  acetaminophen (TYLENOL) 500 mg tablet, Take 1 tablet (500 mg total) by mouth every 8 (eight) hours, Disp: 90 tablet, Rfl: 1  •  Advair -21 MCG/ACT inhaler, , Disp: , Rfl:   •  albuterol (2 5 mg/3 mL) 0 083 % nebulizer solution, Inhale, Disp: , Rfl:   •  albuterol (PROVENTIL HFA,VENTOLIN HFA) 90 mcg/act inhaler, Inhale 2 puffs every 6 (six) hours as needed for wheezing, Disp: , Rfl:   •  cimetidine (TAGAMET) 400 mg tablet, Take 400 mg by mouth 2 (two) times a day, Disp: , Rfl:   •  cyclobenzaprine (FLEXERIL) 10 mg tablet, Take 1 tablet (10 mg total) by mouth 3 (three) times a day as needed for muscle spasms (Patient not taking: Reported on 3/20/2023), Disp: 30 tablet, Rfl: 01  •  EPINEPHrine (EPIPEN) 0 3 mg/0 3 mL SOAJ, INJECT 1 PEN IMMEDIATELY FOR ALLERGIC REACTION FOR 30 DAYS, Disp: , Rfl:   •  gabapentin (Neurontin) 100 mg capsule, Take 1 capsule (100 mg total) by mouth 3 (three) times a day as needed (nerve pain), Disp: 90 capsule, Rfl: 0  •  Multiple Vitamins-Minerals (multivitamin with minerals) tablet, Take 1 tablet by mouth daily, Disp: , Rfl:   •  phentermine (ADIPEX-P) 37 5 MG tablet, Take 1 tablet (37 5 mg total) by mouth daily as needed (appetite control), Disp: 30 tablet, Rfl: 0  •  Xolair subcutaneous injection, , Disp: , Rfl:     Current Allergies     Allergies as of 05/22/2023 - Reviewed 05/22/2023   Allergen Reaction Noted   • Lac bovis Angioedema 02/01/2019   • Shrimp extract allergy skin test - food allergy Angioedema 02/01/2019   • Banana - food allergy Other (See Comments) 03/22/2014   • Fruit extracts  12/17/2013   • Kiwi extract - food allergy Other (See Comments) 03/22/2014   • Latex Other (See Comments) 03/22/2014   • Pineapple - food allergy Hives 03/22/2014   • Pollen extract  06/13/2014            The following portions of the patient's history were reviewed and updated as appropriate: allergies, current medications, past family history, past medical history, past social history, past surgical history and problem list      Past Medical History:   Diagnosis Date   • Allergic    • Asthma    • GERD (gastroesophageal reflux disease)    • Morbid obesity with BMI of 40 0-44 9, adult ([facility] [address] )        Past Surgical History:   Procedure Laterality Date   • CHOLECYSTECTOMY     • TONSILECTOMY AND ADNOIDECTOMY     • TUBAL LIGATION         Family History   Problem Relation Age of Onset   • Diabetes Mother • Hypertension Mother    • Hypertension Father    • Cancer Father         melanoma    • Seizures Sister    • Diabetes Maternal Grandmother    • Hypertension Maternal Grandmother    • Cancer Maternal Grandmother    • Heart disease Maternal Grandmother    • Breast cancer Maternal Aunt    • Thyroid disease Neg Hx    • Stroke Neg Hx          Medications have been verified  Objective   /72 (BP Location: Right arm, Cuff Size: Adult)   Pulse 102   Temp 98 7 °F (37 1 °C) (Tympanic)   Resp 18   SpO2 98%          Physical Exam     Physical Exam  Vitals reviewed  Constitutional:       General: She is awake  She is not in acute distress  Appearance: Normal appearance  She is normal weight  Cardiovascular:      Rate and Rhythm: Normal rate  Pulmonary:      Effort: Pulmonary effort is normal    Musculoskeletal:      Left hand: Swelling, tenderness and bony tenderness present  No deformity  Decreased range of motion  Comments: 2nd MCP     Skin:     General: Skin is warm and moist    Neurological:      General: No focal deficit present  Mental Status: She is alert and oriented to person, place, and time  Psychiatric:         Behavior: Behavior is cooperative           Orthopedic injury treatment    Date/Time: 5/22/2023 3:00 PM  Performed by: NICOLE Awad  Authorized by: NICOLE Awad     Patient Location:  [facility] Protocol:  Procedure performed by:  Consent given by: patient  Patient understanding: patient states understanding of the procedure being performed  Patient identity confirmed: verbally with patient      Immobilization:  Splint  Splint type:  Finger splint, static  Cast type:  Finger  Supplies used:  Aluminum splint  Neurovascular status: Neurovascularly intact    Distal perfusion: normal    Neurological function: normal    Range of motion: unchanged    Patient tolerance:  Patient tolerated the procedure well with no immediate complications

## 2023-05-22 NOTE — LETTER
May 22, 2023     Patient: Kay Murrell   YOB: 1985   Date of Visit: 5/22/2023       To Whom it May Concern:    Kay Murrell was seen in my clinic on 5/22/2023  She may return to work on 5/24/2023  If you have any questions or concerns, please don't hesitate to call           Sincerely,          NICOLE Rivera        CC: No Recipients

## 2023-09-29 ENCOUNTER — APPOINTMENT (OUTPATIENT)
Dept: LAB | Facility: MEDICAL CENTER | Age: 38
End: 2023-09-29
Payer: COMMERCIAL

## 2023-12-29 ENCOUNTER — APPOINTMENT (OUTPATIENT)
Dept: LAB | Facility: MEDICAL CENTER | Age: 38
End: 2023-12-29
Payer: COMMERCIAL

## 2023-12-29 DIAGNOSIS — Z00.00 ROUTINE GENERAL MEDICAL EXAMINATION AT A HEALTH CARE FACILITY: ICD-10-CM

## 2023-12-29 LAB
25(OH)D3 SERPL-MCNC: 22.4 NG/ML (ref 30–100)
ALBUMIN SERPL BCP-MCNC: 4.1 G/DL (ref 3.5–5)
ALP SERPL-CCNC: 58 U/L (ref 34–104)
ALT SERPL W P-5'-P-CCNC: 15 U/L (ref 7–52)
ANION GAP SERPL CALCULATED.3IONS-SCNC: 9 MMOL/L
AST SERPL W P-5'-P-CCNC: 18 U/L (ref 13–39)
BASOPHILS # BLD AUTO: 0.03 THOUSANDS/ÂΜL (ref 0–0.1)
BASOPHILS NFR BLD AUTO: 1 % (ref 0–1)
BILIRUB SERPL-MCNC: 0.87 MG/DL (ref 0.2–1)
BUN SERPL-MCNC: 12 MG/DL (ref 5–25)
CALCIUM SERPL-MCNC: 10 MG/DL (ref 8.4–10.2)
CHLORIDE SERPL-SCNC: 105 MMOL/L (ref 96–108)
CHOLEST SERPL-MCNC: 160 MG/DL
CO2 SERPL-SCNC: 27 MMOL/L (ref 21–32)
CREAT SERPL-MCNC: 0.92 MG/DL (ref 0.6–1.3)
EOSINOPHIL # BLD AUTO: 0.15 THOUSAND/ÂΜL (ref 0–0.61)
EOSINOPHIL NFR BLD AUTO: 3 % (ref 0–6)
ERYTHROCYTE [DISTWIDTH] IN BLOOD BY AUTOMATED COUNT: 13.7 % (ref 11.6–15.1)
ERYTHROCYTE [SEDIMENTATION RATE] IN BLOOD: 40 MM/HOUR (ref 0–19)
FSH SERPL-ACNC: 4.3 MIU/ML
GFR SERPL CREATININE-BSD FRML MDRD: 78 ML/MIN/1.73SQ M
GLUCOSE P FAST SERPL-MCNC: 97 MG/DL (ref 65–99)
HCT VFR BLD AUTO: 41.5 % (ref 34.8–46.1)
HDLC SERPL-MCNC: 56 MG/DL
HGB BLD-MCNC: 12.6 G/DL (ref 11.5–15.4)
IMM GRANULOCYTES # BLD AUTO: 0.01 THOUSAND/UL (ref 0–0.2)
IMM GRANULOCYTES NFR BLD AUTO: 0 % (ref 0–2)
IRON SERPL-MCNC: 99 UG/DL (ref 50–212)
LDLC SERPL CALC-MCNC: 90 MG/DL (ref 0–100)
LYMPHOCYTES # BLD AUTO: 1.3 THOUSANDS/ÂΜL (ref 0.6–4.47)
LYMPHOCYTES NFR BLD AUTO: 25 % (ref 14–44)
MCH RBC QN AUTO: 25.1 PG (ref 26.8–34.3)
MCHC RBC AUTO-ENTMCNC: 30.4 G/DL (ref 31.4–37.4)
MCV RBC AUTO: 83 FL (ref 82–98)
MONOCYTES # BLD AUTO: 0.48 THOUSAND/ÂΜL (ref 0.17–1.22)
MONOCYTES NFR BLD AUTO: 9 % (ref 4–12)
NEUTROPHILS # BLD AUTO: 3.25 THOUSANDS/ÂΜL (ref 1.85–7.62)
NEUTS SEG NFR BLD AUTO: 62 % (ref 43–75)
NONHDLC SERPL-MCNC: 104 MG/DL
NRBC BLD AUTO-RTO: 0 /100 WBCS
PLATELET # BLD AUTO: 260 THOUSANDS/UL (ref 149–390)
PMV BLD AUTO: 13.1 FL (ref 8.9–12.7)
POTASSIUM SERPL-SCNC: 4.1 MMOL/L (ref 3.5–5.3)
PROT SERPL-MCNC: 7.4 G/DL (ref 6.4–8.4)
RBC # BLD AUTO: 5.01 MILLION/UL (ref 3.81–5.12)
SODIUM SERPL-SCNC: 141 MMOL/L (ref 135–147)
TRIGL SERPL-MCNC: 69 MG/DL
TSH SERPL DL<=0.05 MIU/L-ACNC: 1.89 UIU/ML (ref 0.45–4.5)
VIT B12 SERPL-MCNC: 410 PG/ML (ref 180–914)
WBC # BLD AUTO: 5.22 THOUSAND/UL (ref 4.31–10.16)

## 2023-12-29 PROCEDURE — 84443 ASSAY THYROID STIM HORMONE: CPT

## 2023-12-29 PROCEDURE — 85652 RBC SED RATE AUTOMATED: CPT

## 2023-12-29 PROCEDURE — 83540 ASSAY OF IRON: CPT

## 2023-12-29 PROCEDURE — 82306 VITAMIN D 25 HYDROXY: CPT

## 2023-12-29 PROCEDURE — 85025 COMPLETE CBC W/AUTO DIFF WBC: CPT

## 2023-12-29 PROCEDURE — 83001 ASSAY OF GONADOTROPIN (FSH): CPT

## 2023-12-29 PROCEDURE — 86235 NUCLEAR ANTIGEN ANTIBODY: CPT

## 2023-12-29 PROCEDURE — 36415 COLL VENOUS BLD VENIPUNCTURE: CPT

## 2023-12-29 PROCEDURE — 80061 LIPID PANEL: CPT

## 2023-12-29 PROCEDURE — 82607 VITAMIN B-12: CPT

## 2023-12-29 PROCEDURE — 80053 COMPREHEN METABOLIC PANEL: CPT

## 2023-12-30 LAB
ENA SCL70 AB SER-ACNC: <0.2 AI (ref 0–0.9)
ENA SS-A AB SER-ACNC: <0.2 AI (ref 0–0.9)
ENA SS-B AB SER-ACNC: <0.2 AI (ref 0–0.9)

## 2024-02-21 ENCOUNTER — HOSPITAL ENCOUNTER (OUTPATIENT)
Dept: RADIOLOGY | Facility: MEDICAL CENTER | Age: 39
Discharge: HOME | End: 2024-02-21
Payer: COMMERCIAL

## 2024-02-21 VITALS — BODY MASS INDEX: 43.06 KG/M2 | HEIGHT: 62 IN | WEIGHT: 234 LBS

## 2024-02-21 DIAGNOSIS — Z12.31 ENCOUNTER FOR SCREENING MAMMOGRAM FOR MALIGNANT NEOPLASM OF BREAST: ICD-10-CM

## 2024-02-21 PROCEDURE — 77063 BREAST TOMOSYNTHESIS BI: CPT

## 2024-02-21 PROCEDURE — 77067 SCR MAMMO BI INCL CAD: CPT

## 2024-04-04 ENCOUNTER — APPOINTMENT (OUTPATIENT)
Dept: LAB | Facility: MEDICAL CENTER | Age: 39
End: 2024-04-04

## 2024-04-04 DIAGNOSIS — Z00.8 OTHER SPECIFIED GENERAL MEDICAL EXAMINATION: ICD-10-CM

## 2024-04-04 LAB
CHOLEST SERPL-MCNC: 187 MG/DL
EST. AVERAGE GLUCOSE BLD GHB EST-MCNC: 120 MG/DL
HBA1C MFR BLD: 5.8 %
HDLC SERPL-MCNC: 63 MG/DL
LDLC SERPL CALC-MCNC: 109 MG/DL (ref 0–100)
NONHDLC SERPL-MCNC: 124 MG/DL
TRIGL SERPL-MCNC: 74 MG/DL

## 2024-04-04 PROCEDURE — 36415 COLL VENOUS BLD VENIPUNCTURE: CPT

## 2024-04-04 PROCEDURE — 83036 HEMOGLOBIN GLYCOSYLATED A1C: CPT

## 2024-04-04 PROCEDURE — 80061 LIPID PANEL: CPT

## 2024-06-15 ENCOUNTER — OFFICE VISIT (OUTPATIENT)
Dept: URGENT CARE | Facility: MEDICAL CENTER | Age: 39
End: 2024-06-15
Payer: COMMERCIAL

## 2024-06-15 VITALS
BODY MASS INDEX: 43.24 KG/M2 | HEIGHT: 62 IN | DIASTOLIC BLOOD PRESSURE: 72 MMHG | TEMPERATURE: 98 F | RESPIRATION RATE: 18 BRPM | OXYGEN SATURATION: 97 % | SYSTOLIC BLOOD PRESSURE: 116 MMHG | HEART RATE: 70 BPM | WEIGHT: 235 LBS

## 2024-06-15 DIAGNOSIS — M54.31 SCIATICA OF RIGHT SIDE: Primary | ICD-10-CM

## 2024-06-15 PROCEDURE — 99213 OFFICE O/P EST LOW 20 MIN: CPT | Performed by: PHYSICIAN ASSISTANT

## 2024-06-15 RX ORDER — METHOCARBAMOL 500 MG/1
500 TABLET, FILM COATED ORAL 3 TIMES DAILY
Qty: 15 TABLET | Refills: 0 | Status: SHIPPED | OUTPATIENT
Start: 2024-06-15 | End: 2024-06-20

## 2024-06-15 RX ORDER — PREDNISONE 20 MG/1
20 TABLET ORAL 2 TIMES DAILY WITH MEALS
Qty: 10 TABLET | Refills: 0 | Status: SHIPPED | OUTPATIENT
Start: 2024-06-15 | End: 2024-06-20

## 2024-06-15 NOTE — PROGRESS NOTES
[facility]        NAME: Yvonne Delgado is a 39 y.o. female  : 1985    MRN: [id number]  DATE: Sandhya 15, 2024  TIME: 6:16 PM    Assessment and Plan   Sciatica of right side [M54.31]  1. Sciatica of right side  predniSONE 20 mg tablet    methocarbamol (ROBAXIN) 500 mg tablet            Patient Instructions     Take Prednisone 20mg twice daily x 5 days  Use Robaxin 1 tablet 3x daily as needed for spasm  Tylenol as needed for pain  Follow-up with Orthopedics if symptoms persist.       If tests have been performed at [facility], our office will contact you with results if changes need to be made to the care plan discussed with you at the visit.  [facility]    Chief Complaint     Chief Complaint   Patient presents with    Back Pain     Patient states she lifted heavy cases of water 4 days ago and believe she pulled her back out; taking meloxicam with little relief          History of Present Illness       Yvonne is a 39-year-old female who presents with a 4-day history of right-sided lower back pain.  Patient reports her symptoms started after she was lifting several cases of water.  She denies any specific inciting event but reports her pain started the following day.  Patient reports her pain is to the middle of her back into the right side.  She denies any numbness, tingling or weakness.  Patient reports no changes to her bowel or bladder function since the onset of symptoms.    Back Pain  Pertinent negatives include no numbness or weakness.       Review of Systems   Review of Systems   Constitutional: Negative.    Musculoskeletal:  Positive for back pain.   Neurological:  Negative for weakness and numbness.         Current Medications       Current Outpatient Medications:     methocarbamol (ROBAXIN) 500 mg tablet, Take 1 tablet (500 mg total) by mouth 3 (three) times a day for 5 days, Disp: 15 tablet, Rfl: 0    predniSONE 20 mg tablet, Take 1 tablet (20 mg  total) by mouth 2 (two) times a day with meals for 5 days, Disp: 10 tablet, Rfl: 0    acetaminophen (TYLENOL) 500 mg tablet, Take 1 tablet (500 mg total) by mouth every 8 (eight) hours, Disp: 90 tablet, Rfl: 1    Advair -21 MCG/ACT inhaler, , Disp: , Rfl:     albuterol (2.5 mg/3 mL) 0.083 % nebulizer solution, Inhale, Disp: , Rfl:     albuterol (PROVENTIL HFA,VENTOLIN HFA) 90 mcg/act inhaler, Inhale 2 puffs every 6 (six) hours as needed for wheezing, Disp: , Rfl:     cimetidine (TAGAMET) 400 mg tablet, Take 400 mg by mouth 2 (two) times a day, Disp: , Rfl:     EPINEPHrine (EPIPEN) 0.3 mg/0.3 mL SOAJ, INJECT 1 PEN IMMEDIATELY FOR ALLERGIC REACTION FOR 30 DAYS, Disp: , Rfl:     gabapentin (Neurontin) 100 mg capsule, Take 1 capsule (100 mg total) by mouth 3 (three) times a day as needed (nerve pain), Disp: 90 capsule, Rfl: 0    Multiple Vitamins-Minerals (multivitamin with minerals) tablet, Take 1 tablet by mouth daily, Disp: , Rfl:     phentermine (ADIPEX-P) 37.5 MG tablet, Take 1 tablet (37.5 mg total) by mouth daily as needed (appetite control), Disp: 30 tablet, Rfl: 0    Xolair subcutaneous injection, , Disp: , Rfl:     Current Allergies     Allergies as of 06/15/2024 - Reviewed 06/15/2024   Allergen Reaction Noted    Milk (cow) Angioedema 02/01/2019    Shrimp extract allergy skin test - food allergy Angioedema 02/01/2019    Banana - food allergy Other (See Comments) 03/22/2014    Fruit extracts  12/17/2013    Kiwi extract - food allergy Other (See Comments) 03/22/2014    Latex Other (See Comments) 03/22/2014    Pineapple - food allergy Hives 03/22/2014    Pollen extract  06/13/2014            The following portions of the patient's history were reviewed and updated as appropriate: allergies, current medications, past family history, past medical history, past social history, past surgical history and problem list.     Past Medical History:   Diagnosis Date    Allergic     Asthma     GERD (gastroesophageal  "reflux disease)     Morbid obesity with BMI of 40.0-44.9, adult ([facility])        Past Surgical History:   Procedure Laterality Date    CHOLECYSTECTOMY      TONSILECTOMY AND ADNOIDECTOMY      TUBAL LIGATION         Family History   Problem Relation Age of Onset    Diabetes Mother     Hypertension Mother     Hypertension Father     Cancer Father         melanoma     Seizures Sister     No Known Problems Daughter     No Known Problems Daughter     Diabetes Maternal Grandmother     Hypertension Maternal Grandmother     Cancer Maternal Grandmother         lung cancer    Heart disease Maternal Grandmother     No Known Problems Maternal Grandfather     No Known Problems Paternal Grandmother     No Known Problems Paternal Grandfather     Breast cancer Maternal Aunt 50    Breast cancer Maternal Aunt 40    No Known Problems Maternal Aunt     No Known Problems Maternal Aunt     No Known Problems Son     No Known Problems Half-Sister     No Known Problems Half-Sister     No Known Problems Half-Sister     No Known Problems Half-Sister     No Known Problems Half-Brother     No Known Problems Half-Brother     No Known Problems Paternal Aunt     No Known Problems Paternal Aunt     No Known Problems Paternal Uncle     Thyroid disease Neg Hx     Stroke Neg Hx          Medications have been verified.        Objective   /72   Pulse 70   Temp 98 °F (36.7 °C) (Temporal)   Resp 18   Ht 5' 2\" (1.575 m) Comment: stated  Wt 107 kg (235 lb) Comment: stated  SpO2 97%   BMI 42.98 kg/m²   No LMP recorded.       Physical Exam     Physical Exam  Constitutional:       General: She is not in acute distress.     Appearance: Normal appearance. She is not ill-appearing.   Cardiovascular:      Rate and Rhythm: Normal rate and regular rhythm.      Heart sounds: No murmur heard.  Pulmonary:      Effort: Pulmonary effort is normal.      Breath sounds: Normal breath sounds.   Musculoskeletal:      Lumbar back: Spasms and tenderness present. No bony " tenderness. Decreased range of motion. Positive right straight leg raise test.   Neurological:      Mental Status: She is alert.      Sensory: Sensation is intact. No sensory deficit.      Motor: Motor function is intact.      Deep Tendon Reflexes:      Reflex Scores:       Patellar reflexes are 2+ on the right side and 2+ on the left side.       Achilles reflexes are 2+ on the right side and 2+ on the left side.     Comments: + SLR on right

## 2024-06-15 NOTE — PATIENT INSTRUCTIONS
Take Prednisone 20mg twice daily x 5 days  Use Robaxin 1 tablet 3x daily as needed for spasm  Tylenol as needed for pain  Follow-up with Orthopedics if symptoms persist.

## 2024-07-01 ENCOUNTER — TELEPHONE (OUTPATIENT)
Dept: PHYSICAL THERAPY | Facility: OTHER | Age: 39
End: 2024-07-01

## 2024-07-01 NOTE — TELEPHONE ENCOUNTER
"Patient called into [facility] on Saturday 06/29 and left v/m @10:22pm (after hours).    Returned patient's call today 07/01 @8:13am.    Spoke with patient, explained program protocol and what we offer.    Patient declined services for now. Patient states \" I will continue doing stretches with my  at home and will call you back if pain returns\".    [facility] phone number and hours of business provided.    No referral to close or defer.  "

## 2024-09-29 ENCOUNTER — APPOINTMENT (OUTPATIENT)
Dept: RADIOLOGY | Facility: MEDICAL CENTER | Age: 39
End: 2024-09-29
Payer: COMMERCIAL

## 2024-09-29 ENCOUNTER — OFFICE VISIT (OUTPATIENT)
Dept: URGENT CARE | Facility: MEDICAL CENTER | Age: 39
End: 2024-09-29
Payer: COMMERCIAL

## 2024-09-29 VITALS
HEART RATE: 98 BPM | DIASTOLIC BLOOD PRESSURE: 76 MMHG | TEMPERATURE: 98.9 F | SYSTOLIC BLOOD PRESSURE: 113 MMHG | RESPIRATION RATE: 22 BRPM | OXYGEN SATURATION: 99 %

## 2024-09-29 DIAGNOSIS — R06.02 SHORTNESS OF BREATH: ICD-10-CM

## 2024-09-29 DIAGNOSIS — J40 SINOBRONCHITIS: Primary | ICD-10-CM

## 2024-09-29 DIAGNOSIS — J32.9 SINOBRONCHITIS: Primary | ICD-10-CM

## 2024-09-29 PROCEDURE — 71046 X-RAY EXAM CHEST 2 VIEWS: CPT

## 2024-09-29 PROCEDURE — 99213 OFFICE O/P EST LOW 20 MIN: CPT

## 2024-09-29 RX ORDER — BENZONATATE 200 MG/1
200 CAPSULE ORAL 3 TIMES DAILY PRN
Qty: 20 CAPSULE | Refills: 0 | Status: SHIPPED | OUTPATIENT
Start: 2024-09-29

## 2024-09-29 RX ORDER — ALBUTEROL SULFATE 90 UG/1
2 INHALANT RESPIRATORY (INHALATION) EVERY 6 HOURS PRN
Qty: 8.5 G | Refills: 0 | Status: SHIPPED | OUTPATIENT
Start: 2024-09-29

## 2024-09-29 NOTE — PATIENT INSTRUCTIONS
Take Augmentin 2 times daily for the next 5 days.  Please trial Tessalon every 8 hours as needed for cough.   Please trial Albuterol every 6 hours as needed for chest tightness.   Continue with OTC cough and cold medication.   Drink plenty of fluids.   Follow up with PCP in 3-5 days.  Proceed to  ER if symptoms worsen.    If tests are performed, our office will contact you with results only if changes need to made to the care plan discussed with you at the visit. You can review your full results on [facility].

## 2024-09-29 NOTE — PROGRESS NOTES
[facility]        NAME: Yvonne Delgado is a 39 y.o. female  : 1985    MRN: [id number]  DATE: 2024  TIME: 1:58 PM    Assessment and Plan   Sinobronchitis [J32.9, J40]  1. Sinobronchitis  amoxicillin-clavulanate (AUGMENTIN) 875-125 mg per tablet    benzonatate (TESSALON) 200 MG capsule    albuterol (ProAir HFA) 90 mcg/act inhaler      2. Shortness of breath  XR chest pa and lateral        Chest x-ray reviewed, no acute cardiopulmonary disease identified, pending radiology review.    Patient Instructions     Take Augmentin 2 times daily for the next 5 days.  Please trial Tessalon every 8 hours as needed for cough.   Please trial Albuterol every 6 hours as needed for chest tightness.   Continue with OTC cough and cold medication.   Drink plenty of fluids.   Follow up with PCP in 3-5 days.  Proceed to  ER if symptoms worsen.    If tests are performed, our office will contact you with results only if changes need to made to the care plan discussed with you at the visit. You can review your full results on [facility].    Chief Complaint     Chief Complaint   Patient presents with    Sore Throat     2.5 weeks of loss of voice; states intermittent razor feeling, green mucous     Cough    Sinusitis     Sinus pressure, nasal drainage     Shortness of Breath     Chest congestion, states she has been using nebulizer more than usual over the last few days; concerned about walking pneumonia          History of Present Illness       39-year-old female presenting for evaluation of upper respiratory symptoms.  Patient states over the past 2 and half weeks she has been experiencing cough, congestion and sinus pain.  She states that her cough is productive, but she is unable to clear the mucus.  She denies any measured fevers but notes chills.  She denies any nausea, vomiting, diarrhea or chest pain.  Patient admits to mild chest tightness and shortness of breath.    Sore Throat   Associated  symptoms include congestion, coughing and shortness of breath. Pertinent negatives include no vomiting.   Cough  Associated symptoms include chills, a sore throat and shortness of breath. Pertinent negatives include no chest pain or fever.   Sinusitis  Associated symptoms include chills, congestion, coughing, shortness of breath, sinus pressure and a sore throat.   Shortness of Breath  Associated symptoms include coughing and a sore throat. Pertinent negatives include no chest pain.       Review of Systems   Review of Systems   Constitutional:  Positive for chills. Negative for fever.   HENT:  Positive for congestion, sinus pressure and sore throat.    Respiratory:  Positive for cough, chest tightness and shortness of breath.    Cardiovascular:  Negative for chest pain.   Gastrointestinal:  Negative for nausea and vomiting.         Current Medications       Current Outpatient Medications:     albuterol (ProAir HFA) 90 mcg/act inhaler, Inhale 2 puffs every 6 (six) hours as needed for wheezing or shortness of breath, Disp: 8.5 g, Rfl: 0    amoxicillin-clavulanate (AUGMENTIN) 875-125 mg per tablet, Take 1 tablet by mouth every 12 (twelve) hours for 5 days, Disp: 10 tablet, Rfl: 0    benzonatate (TESSALON) 200 MG capsule, Take 1 capsule (200 mg total) by mouth 3 (three) times a day as needed for cough, Disp: 20 capsule, Rfl: 0    acetaminophen (TYLENOL) 500 mg tablet, Take 1 tablet (500 mg total) by mouth every 8 (eight) hours, Disp: 90 tablet, Rfl: 1    Advair -21 MCG/ACT inhaler, , Disp: , Rfl:     albuterol (2.5 mg/3 mL) 0.083 % nebulizer solution, Inhale, Disp: , Rfl:     albuterol (PROVENTIL HFA,VENTOLIN HFA) 90 mcg/act inhaler, Inhale 2 puffs every 6 (six) hours as needed for wheezing, Disp: , Rfl:     cimetidine (TAGAMET) 400 mg tablet, Take 400 mg by mouth 2 (two) times a day, Disp: , Rfl:     EPINEPHrine (EPIPEN) 0.3 mg/0.3 mL SOAJ, INJECT 1 PEN IMMEDIATELY FOR ALLERGIC REACTION FOR 30 DAYS, Disp: , Rfl:      gabapentin (Neurontin) 100 mg capsule, Take 1 capsule (100 mg total) by mouth 3 (three) times a day as needed (nerve pain), Disp: 90 capsule, Rfl: 0    methocarbamol (ROBAXIN) 500 mg tablet, Take 1 tablet (500 mg total) by mouth 3 (three) times a day for 5 days, Disp: 15 tablet, Rfl: 0    Multiple Vitamins-Minerals (multivitamin with minerals) tablet, Take 1 tablet by mouth daily, Disp: , Rfl:     phentermine (ADIPEX-P) 37.5 MG tablet, Take 1 tablet (37.5 mg total) by mouth daily as needed (appetite control), Disp: 30 tablet, Rfl: 0    Xolair subcutaneous injection, , Disp: , Rfl:     Current Allergies     Allergies as of 09/29/2024 - Reviewed 09/29/2024   Allergen Reaction Noted    Milk (cow) Angioedema 02/01/2019    Shrimp extract allergy skin test - food allergy Angioedema 02/01/2019    Banana - food allergy Other (See Comments) 03/22/2014    Fruit extracts  12/17/2013    Kiwi extract - food allergy Other (See Comments) 03/22/2014    Latex Other (See Comments) 03/22/2014    Pineapple - food allergy Hives 03/22/2014    Pollen extract  06/13/2014            The following portions of the patient's history were reviewed and updated as appropriate: allergies, current medications, past family history, past medical history, past social history, past surgical history and problem list.     Past Medical History:   Diagnosis Date    Allergic     Asthma     GERD (gastroesophageal reflux disease)     Morbid obesity with BMI of 40.0-44.9, adult ([facility])        Past Surgical History:   Procedure Laterality Date    CHOLECYSTECTOMY      TONSILECTOMY AND ADNOIDECTOMY      TUBAL LIGATION         Family History   Problem Relation Age of Onset    Diabetes Mother     Hypertension Mother     Hypertension Father     Cancer Father         melanoma     Seizures Sister     No Known Problems Daughter     No Known Problems Daughter     Diabetes Maternal Grandmother     Hypertension Maternal Grandmother     Cancer Maternal Grandmother          lung cancer    Heart disease Maternal Grandmother     No Known Problems Maternal Grandfather     No Known Problems Paternal Grandmother     No Known Problems Paternal Grandfather     Breast cancer Maternal Aunt 50    Breast cancer Maternal Aunt 40    No Known Problems Maternal Aunt     No Known Problems Maternal Aunt     No Known Problems Son     No Known Problems Half-Sister     No Known Problems Half-Sister     No Known Problems Half-Sister     No Known Problems Half-Sister     No Known Problems Half-Brother     No Known Problems Half-Brother     No Known Problems Paternal Aunt     No Known Problems Paternal Aunt     No Known Problems Paternal Uncle     Thyroid disease Neg Hx     Stroke Neg Hx          Medications have been verified.        Objective   /76 (BP Location: Left arm, Patient Position: Sitting, Cuff Size: Large)   Pulse 98   Temp 98.9 °F (37.2 °C) (Temporal)   Resp 22   SpO2 99%        Physical Exam     Physical Exam  Vitals and nursing note reviewed.   Constitutional:       General: She is not in acute distress.     Appearance: Normal appearance. She is normal weight. She is not ill-appearing, toxic-appearing or diaphoretic.   HENT:      Head: Normocephalic and atraumatic.      Right Ear: Tympanic membrane normal.      Left Ear: Tympanic membrane normal.      Nose: Congestion present. No rhinorrhea.      Right Sinus: Maxillary sinus tenderness present. No frontal sinus tenderness.      Left Sinus: Maxillary sinus tenderness present. No frontal sinus tenderness.      Mouth/Throat:      Mouth: Mucous membranes are moist.      Pharynx: Oropharynx is clear. Posterior oropharyngeal erythema present. No oropharyngeal exudate.   Eyes:      General:         Right eye: No discharge.         Left eye: No discharge.   Cardiovascular:      Rate and Rhythm: Normal rate and regular rhythm.      Pulses: Normal pulses.      Heart sounds: Normal heart sounds. No murmur heard.     No friction rub. No gallop.    Pulmonary:      Effort: Pulmonary effort is normal. No respiratory distress.      Breath sounds: No stridor. Examination of the right-lower field reveals decreased breath sounds. Examination of the left-lower field reveals decreased breath sounds. Decreased breath sounds present. No wheezing, rhonchi or rales.   Chest:      Chest wall: No tenderness.   Abdominal:      General: Bowel sounds are normal.      Palpations: Abdomen is soft.      Tenderness: There is no abdominal tenderness.   Skin:     General: Skin is warm and dry.   Neurological:      Mental Status: She is alert and oriented to person, place, and time.   Psychiatric:         Mood and Affect: Mood normal.         Behavior: Behavior normal.

## 2025-01-05 ENCOUNTER — OFFICE VISIT (OUTPATIENT)
Dept: URGENT CARE | Facility: MEDICAL CENTER | Age: 40
End: 2025-01-05
Payer: COMMERCIAL

## 2025-01-05 ENCOUNTER — RESULTS FOLLOW-UP (OUTPATIENT)
Dept: URGENT CARE | Facility: MEDICAL CENTER | Age: 40
End: 2025-01-05

## 2025-01-05 ENCOUNTER — APPOINTMENT (OUTPATIENT)
Dept: RADIOLOGY | Facility: MEDICAL CENTER | Age: 40
End: 2025-01-05
Payer: COMMERCIAL

## 2025-01-05 VITALS
RESPIRATION RATE: 20 BRPM | HEART RATE: 80 BPM | TEMPERATURE: 98.9 F | OXYGEN SATURATION: 97 % | DIASTOLIC BLOOD PRESSURE: 68 MMHG | SYSTOLIC BLOOD PRESSURE: 126 MMHG

## 2025-01-05 DIAGNOSIS — J45.31 MILD PERSISTENT ASTHMA WITH ACUTE EXACERBATION: Primary | ICD-10-CM

## 2025-01-05 DIAGNOSIS — R06.2 WHEEZING: ICD-10-CM

## 2025-01-05 DIAGNOSIS — R06.02 SHORTNESS OF BREATH: ICD-10-CM

## 2025-01-05 PROCEDURE — 71046 X-RAY EXAM CHEST 2 VIEWS: CPT

## 2025-01-05 PROCEDURE — 99214 OFFICE O/P EST MOD 30 MIN: CPT

## 2025-01-05 RX ORDER — PREDNISONE 10 MG/1
TABLET ORAL
Qty: 30 TABLET | Refills: 0 | Status: SHIPPED | OUTPATIENT
Start: 2025-01-05 | End: 2025-01-17

## 2025-01-05 RX ORDER — BECLOMETHASONE DIPROPIONATE HFA 80 UG/1
2 AEROSOL, METERED RESPIRATORY (INHALATION) 2 TIMES DAILY
Qty: 10.6 G | Refills: 0 | Status: SHIPPED | OUTPATIENT
Start: 2025-01-05 | End: 2025-01-13

## 2025-01-05 NOTE — PATIENT INSTRUCTIONS
Take steroids as prescribed.   Recommend to take them in the morning and with food  Do not take Ibuprofen while on steroids.   May take Acetaminophen for pain.  Discussed that steroids may elevated blood glucose levels and that pt should monitor blood sugars closely.     Albuterol nebulizer every 4-6 hours as previously prescribe for shortness of breath or wheezing    Will start patient on Qvar RediHaler   Advised patient to rinse mouth after every use    Would also benefit from follow up for PFT's and maintenance inhalers due to approx 4 acute exacerbations a year of her asthma.   Discuss with patient.   Referral placed to Pulmonology.    Follow up with PCP in 3-5 days.  Proceed to ER if symptoms worsen.    If tests are performed, our office will contact you with results only if changes need to made to the care plan discussed with you at the visit. You can review your full results on [facility].

## 2025-01-05 NOTE — PROGRESS NOTES
[facility]        NAME: Yvonne Delgado is a 39 y.o. female  : 1985    MRN: [id number]  DATE: 2025  TIME: 12:20 PM    Assessment and Plan   Mild persistent asthma with acute exacerbation [J45.31]  1. Mild persistent asthma with acute exacerbation  predniSONE 10 mg tablet    Ambulatory Referral to Pulmonology    beclomethasone (Qvar RediHaler) 80 MCG/ACT inhaler      2. Wheezing  predniSONE 10 mg tablet    beclomethasone (Qvar RediHaler) 80 MCG/ACT inhaler      3. Shortness of breath  XR chest pa and lateral    predniSONE 10 mg tablet    beclomethasone (Qvar RediHaler) 80 MCG/ACT inhaler        XR chest pa & lateral: No acute findings per my read, pending radiology final read.    Patient Instructions   Take steroids as prescribed.   Recommend to take them in the morning and with food  Do not take Ibuprofen while on steroids.   May take Acetaminophen for pain.  Discussed that steroids may elevated blood glucose levels and that pt should monitor blood sugars closely.     Albuterol nebulizer every 4-6 hours as previously prescribe for shortness of breath or wheezing    Will start patient on Qvar RediHaler   Advised patient to rinse mouth after every use    Would also benefit from follow up for PFT's and maintenance inhalers due to approx 4 acute exacerbations a year of her asthma.   Discuss with patient.   Referral placed to Pulmonology.    Follow up with PCP in 3-5 days.  Proceed to ER if symptoms worsen.    If tests are performed, our office will contact you with results only if changes need to made to the care plan discussed with you at the visit. [facility]    Chief Complaint     Chief Complaint   Patient presents with    Shortness of Breath     Cough, shortness of breath worse with exertion, wheezing; ongoing for over two weeks; worried about pneumonia     Wheezing    Cough     History of Present Illness       Cough  This is a new problem. The  current episode started 1 to 4 weeks ago (x2 weeks). The problem has been unchanged. The cough is Non-productive. Associated symptoms include headaches (due to coughing), shortness of breath and wheezing. Pertinent negatives include no chest pain, chills, ear pain, fever, myalgias, rash, rhinorrhea or sore throat. She has tried a beta-agonist inhaler (Using a double dose of her albuterol nebulizer during each neg treatment, also using albuterol inhaler) for the symptoms. The treatment provided no relief. Her past medical history is significant for asthma, bronchitis, environmental allergies and pneumonia.       Review of Systems   Review of Systems   Constitutional:  Positive for fatigue. Negative for chills, diaphoresis and fever.   HENT:  Negative for congestion, ear discharge, ear pain, rhinorrhea, sinus pressure, sinus pain and sore throat.    Respiratory:  Positive for cough, shortness of breath and wheezing.    Cardiovascular: Negative.  Negative for chest pain and palpitations.   Gastrointestinal:  Negative for constipation, diarrhea, nausea and vomiting.   Musculoskeletal:  Negative for myalgias.   Skin:  Negative for color change, rash and wound.   Allergic/Immunologic: Positive for environmental allergies.   Neurological:  Positive for headaches (due to coughing).     Current Medications       Current Outpatient Medications:     beclomethasone (Qvar RediHaler) 80 MCG/ACT inhaler, Inhale 2 puffs 2 (two) times a day Rinse mouth after use., Disp: 10.6 g, Rfl: 0    predniSONE 10 mg tablet, Take 4 tablets (40 mg total) by mouth daily for 3 days, THEN 3 tablets (30 mg total) daily for 3 days, THEN 2 tablets (20 mg total) daily for 3 days, THEN 1 tablet (10 mg total) daily for 3 days., Disp: 30 tablet, Rfl: 0    acetaminophen (TYLENOL) 500 mg tablet, Take 1 tablet (500 mg total) by mouth every 8 (eight) hours, Disp: 90 tablet, Rfl: 1    albuterol (2.5 mg/3 mL) 0.083 % nebulizer solution, Inhale, Disp: , Rfl:      albuterol (ProAir HFA) 90 mcg/act inhaler, Inhale 2 puffs every 6 (six) hours as needed for wheezing or shortness of breath, Disp: 8.5 g, Rfl: 0    albuterol (PROVENTIL HFA,VENTOLIN HFA) 90 mcg/act inhaler, Inhale 2 puffs every 6 (six) hours as needed for wheezing, Disp: , Rfl:     benzonatate (TESSALON) 200 MG capsule, Take 1 capsule (200 mg total) by mouth 3 (three) times a day as needed for cough, Disp: 20 capsule, Rfl: 0    cimetidine (TAGAMET) 400 mg tablet, Take 400 mg by mouth 2 (two) times a day, Disp: , Rfl:     EPINEPHrine (EPIPEN) 0.3 mg/0.3 mL SOAJ, INJECT 1 PEN IMMEDIATELY FOR ALLERGIC REACTION FOR 30 DAYS, Disp: , Rfl:     gabapentin (Neurontin) 100 mg capsule, Take 1 capsule (100 mg total) by mouth 3 (three) times a day as needed (nerve pain), Disp: 90 capsule, Rfl: 0    methocarbamol (ROBAXIN) 500 mg tablet, Take 1 tablet (500 mg total) by mouth 3 (three) times a day for 5 days, Disp: 15 tablet, Rfl: 0    Multiple Vitamins-Minerals (multivitamin with minerals) tablet, Take 1 tablet by mouth daily, Disp: , Rfl:     phentermine (ADIPEX-P) 37.5 MG tablet, Take 1 tablet (37.5 mg total) by mouth daily as needed (appetite control), Disp: 30 tablet, Rfl: 0    Xolair subcutaneous injection, , Disp: , Rfl:     Current Allergies     Allergies as of 01/05/2025 - Reviewed 01/05/2025   Allergen Reaction Noted    Milk (cow) Angioedema 02/01/2019    Shrimp extract allergy skin test - food allergy Angioedema 02/01/2019    Banana - food allergy Other (See Comments) 03/22/2014    Fruit extracts  12/17/2013    Kiwi extract - food allergy Other (See Comments) 03/22/2014    Latex Other (See Comments) 03/22/2014    Pineapple - food allergy Hives 03/22/2014    Pollen extract  06/13/2014            The following portions of the patient's history were reviewed and updated as appropriate: allergies, current medications, past family history, past medical history, past social history, past surgical history and problem list.      Past Medical History:   Diagnosis Date    Allergic     Asthma     GERD (gastroesophageal reflux disease)     Morbid obesity with BMI of 40.0-44.9, adult ([facility])        Past Surgical History:   Procedure Laterality Date    CHOLECYSTECTOMY      TONSILECTOMY AND ADNOIDECTOMY      TUBAL LIGATION         Family History   Problem Relation Age of Onset    Diabetes Mother     Hypertension Mother     Hypertension Father     Cancer Father         melanoma     Seizures Sister     No Known Problems Daughter     No Known Problems Daughter     Diabetes Maternal Grandmother     Hypertension Maternal Grandmother     Cancer Maternal Grandmother         lung cancer    Heart disease Maternal Grandmother     No Known Problems Maternal Grandfather     No Known Problems Paternal Grandmother     No Known Problems Paternal Grandfather     Breast cancer Maternal Aunt 50    Breast cancer Maternal Aunt 40    No Known Problems Maternal Aunt     No Known Problems Maternal Aunt     No Known Problems Son     No Known Problems Half-Sister     No Known Problems Half-Sister     No Known Problems Half-Sister     No Known Problems Half-Sister     No Known Problems Half-Brother     No Known Problems Half-Brother     No Known Problems Paternal Aunt     No Known Problems Paternal Aunt     No Known Problems Paternal Uncle     Thyroid disease Neg Hx     Stroke Neg Hx          Medications have been verified.        Objective   /68   Pulse 80   Temp 98.9 °F (37.2 °C)   Resp 20   SpO2 97%        Physical Exam     Physical Exam  Vitals and nursing note reviewed.   Constitutional:       General: She is not in acute distress.     Appearance: Normal appearance. She is not ill-appearing, toxic-appearing or diaphoretic.   HENT:      Head: Normocephalic.      Right Ear: Tympanic membrane, ear canal and external ear normal. There is no impacted cerumen.      Left Ear: Tympanic membrane, ear canal and external ear normal. There is no impacted cerumen.       Nose: Nose normal. No congestion or rhinorrhea.      Mouth/Throat:      Mouth: Mucous membranes are moist.      Pharynx: No posterior oropharyngeal erythema.   Cardiovascular:      Rate and Rhythm: Normal rate and regular rhythm.      Pulses: Normal pulses.      Heart sounds: Normal heart sounds. No murmur heard.  Pulmonary:      Effort: Pulmonary effort is normal. Prolonged expiration present.      Breath sounds: Wheezing (minimal expiratory throughout) present.   Musculoskeletal:         General: Normal range of motion.   Lymphadenopathy:      Cervical: No cervical adenopathy.   Skin:     General: Skin is warm.   Neurological:      Mental Status: She is alert.

## 2025-01-13 ENCOUNTER — OFFICE VISIT (OUTPATIENT)
Dept: FAMILY MEDICINE CLINIC | Facility: CLINIC | Age: 40
End: 2025-01-13
Payer: COMMERCIAL

## 2025-01-13 VITALS
TEMPERATURE: 97.7 F | DIASTOLIC BLOOD PRESSURE: 74 MMHG | WEIGHT: 261 LBS | RESPIRATION RATE: 18 BRPM | BODY MASS INDEX: 49.28 KG/M2 | HEIGHT: 61 IN | SYSTOLIC BLOOD PRESSURE: 122 MMHG

## 2025-01-13 DIAGNOSIS — E78.5 DYSLIPIDEMIA: ICD-10-CM

## 2025-01-13 DIAGNOSIS — Z00.00 ANNUAL PHYSICAL EXAM: Primary | ICD-10-CM

## 2025-01-13 DIAGNOSIS — E66.813 CLASS 3 SEVERE OBESITY WITH BODY MASS INDEX (BMI) OF 45.0 TO 49.9 IN ADULT, UNSPECIFIED OBESITY TYPE, UNSPECIFIED WHETHER SERIOUS COMORBIDITY PRESENT (HCC): ICD-10-CM

## 2025-01-13 DIAGNOSIS — E66.01 CLASS 3 SEVERE OBESITY WITH BODY MASS INDEX (BMI) OF 45.0 TO 49.9 IN ADULT, UNSPECIFIED OBESITY TYPE, UNSPECIFIED WHETHER SERIOUS COMORBIDITY PRESENT (HCC): ICD-10-CM

## 2025-01-13 DIAGNOSIS — R73.03 PREDIABETES: ICD-10-CM

## 2025-01-13 DIAGNOSIS — L65.9 HAIR THINNING: ICD-10-CM

## 2025-01-13 DIAGNOSIS — Z12.31 ENCOUNTER FOR SCREENING MAMMOGRAM FOR BREAST CANCER: ICD-10-CM

## 2025-01-13 DIAGNOSIS — Z01.419 WELL WOMAN EXAM: ICD-10-CM

## 2025-01-13 DIAGNOSIS — J45.20 MILD INTERMITTENT ASTHMA WITHOUT COMPLICATION: ICD-10-CM

## 2025-01-13 PROCEDURE — 99396 PREV VISIT EST AGE 40-64: CPT

## 2025-01-13 RX ORDER — BUDESONIDE AND FORMOTEROL FUMARATE DIHYDRATE 80; 4.5 UG/1; UG/1
2 AEROSOL RESPIRATORY (INHALATION) 2 TIMES DAILY
Qty: 10.2 G | Refills: 1 | Status: SHIPPED | OUTPATIENT
Start: 2025-01-13

## 2025-01-13 NOTE — PROGRESS NOTES
Adult Annual Physical  Name: Yvonne Delgado      : 1985      MRN: [id number]  Encounter Provider: Brant Alonso DO  Encounter Date: 2025   Encounter department: [facility]    Assessment & Plan  Annual physical exam  - screening for cardiovascular disease: bmp, lipid, A1c, tsh ordered today.  Previous lipid panel and A1c reviewed  - screening for breast cancer: mammogram ordered today  - screening for cervical cancer: Due for Pap, refer to GYN  - screening for colon cancer: Due at age 43  - immunizations: Would recommend Tdap, patient would like to get at next visit  - counseled pt on lifestyle modifications such as diet changes and 150 mins/weekly of moderate intensity exercise         Class 3 severe obesity with body mass index (BMI) of 45.0 to 49.9 in adult, unspecified obesity type, unspecified whether serious comorbidity present (HCC)  Prior Authorization Clinical Questions for Weight Management Pharmacotherapy    1. Does the patient have a contrainidcation to medication prescribed for weight management?: No  2. Does the patient have a diagnosis of obesity, confirmed by a BMI greater than or equal to 30 kg/m^2?: Yes  3. Does the patient have a BMI of greater than or equal to 27 kg/m^2 with at least one weight-related comorbidity/risk factor/complication (e.g. diabetes, dyslipidemia, coronary artery disease)?: Yes  4. Weight-related co-morbidities/risk factors: prediabetes, dyslipidemia, asthma/reactive airway disease  5. Has the patient been on a weight loss regimen of low-calorie diet, increased physical activity, and lifestyle modifications for a minimum of 6 months?: Yes  6. Has the patient completed a comprehensive weight loss program (ie, Weight Watchers, Noom, Bariatrics, other ulysses on phone)? If so, what?: Yes   -- Q6 Further Explanation: Follow-up with [facility] weight management in the past   7. Does the patient have a history of type 2 diabetes?: No  8. Has the member  tried and failed other weight loss medication within the past 12 months?: No  9. Will the member use requested medication in combination with another GLP agonist or weight loss drug?: No  10. Is the medication a controlled substance?: No  Additional comments: Patient previously tried phentermine, did not tolerate side effects.  Also reports that she was on Wegovy previously for about 6 weeks but discontinued due to concern about potential side effects though she did say that she did not experience any.  She is willing to try this again.     Baseline weight (in pounds): 261 lbs  Current weight (in pounds): 261 lbs  Weight loss percentage: 0%       Patient reports that her gallbladder was previous removed, no history of pancreatitis in the past, denies any personal family history of thyroid cancer.  Has previously followed with [facility] weight management, not interested in bariatric surgery, previously followed with nutritionist EMILY.  Would recommend continuing lifestyle modifications with diet and exercise.  Recommend increasing exercising 250 minutes of moderate intensity exercise weekly.  Referral to sports   Patient to call insurance company and see which injectable weight loss medication is covered by insurance before ordering, she will give a call/send a message when she knows.    Orders:    Ambulatory referral to Sports     Well woman exam  Due for Pap, would like referral to GYN.  Orders:    Ambulatory Referral to Obstetrics / Gynecology; Future    Hair thinning    Orders:    TSH, 3rd generation with Free T4 reflex; Future    Prediabetes  A1c 5.8 from 4/2024 labs  We will recheck labs  Consult lifestyle modifications  Orders:    Hemoglobin A1C; Future    Basic metabolic panel; Future    Dyslipidemia   per last labs 4/2024  Recheck labs  Consult lifestyle modifications as above  Orders:    Lipid panel; Future    Encounter for screening mammogram for breast  cancer    Orders:    Mammo screening bilateral w 3d and cad; Future    Mild intermittent asthma without complication  History of multiple asthma flares over the last year, most recent was about 2 weeks ago, patient followed with urgent care and was placed on a prednisone taper.  She was also prescribed Qvar RediHaler but this was not available at her pharmacy, will send Symbicort.  Patient to start Symbicort daily, complete prednisone taper, has albuterol to use as needed.  Was previous over to pulmonology, patient to schedule appointment due to frequent attacks    Orders:    budesonide-formoterol (Symbicort) 80-4.5 MCG/ACT inhaler; Inhale 2 puffs 2 (two) times a day Rinse mouth after use.    Immunizations and preventive care screenings were discussed with patient today. Appropriate education was printed on patient's after visit summary.    Counseling:  Alcohol/drug use: discussed moderation in alcohol intake, the recommendations for healthy alcohol use, and avoidance of illicit drug use.  Dental Health: discussed importance of regular tooth brushing, flossing, and dental visits.  Injury prevention: discussed safety/seat belts, safety helmets, smoke detectors, carbon monoxide detectors, and smoking near bedding or upholstery.  Sexual health: discussed sexually transmitted diseases, partner selection, use of condoms, avoidance of unintended pregnancy, and contraceptive alternatives.  Exercise: the importance of regular exercise/physical activity was discussed. Recommend exercise 3-5 times per week for at least 30 minutes.          History of Present Illness     Adult Annual Physical:  Patient presents for annual physical. Patient here to establish care and for annual physical.  Patient works as a phlebotomy instructor.  History of prediabetes and dyslipidemia per last labs 4/2024.  She is concerned that her overall health as well as her prior lab results and is motivated to get healthy and lose weight.  Reports that  she has tried phentermine and Wegovy in the past-did not tolerate side effects of phentermine and patient was concerned about potential side effects of Wegovy ( was on Ozempic and had severe constipation) and so she self discontinued.  She previously followed with [facility]'s weight watchers program in the past, but did not pursue this further as she did not want to move forward with bariatric surgery and is still not interested in that at this time.  She reports that this is the heaviest that she has ever been her goal is to lose about 90 pounds.  Does report that she is eating well, increase protein and water intake, decrease sugar intake, gets fruits and veggies.  Does try to work out at home on the weekends, gets about 45 minutes.    Patient reports family history significant for melanoma in her father, lung cancer in her maternal grandmother, breast cancer in 2 of her maternal aunts..     Diet and Physical Activity:  - Diet/Nutrition: consuming 3-5 servings of fruits/vegetables daily, adequate whole grain intake, adequate fiber intake and low carb diet.  - Exercise: moderate cardiovascular exercise, walking, 1-2 times a week on average and 30-60 minutes on average.    Depression Screening:  - PHQ-2 Score: 1    General Health:  - Sleep: sleeps poorly and snores loudly. will be seeing pulm  - Hearing: normal hearing bilateral ears.  - Vision: no vision problems and goes for regular eye exams.  - Dental: brushes teeth twice daily.    /GYN Health:  - Follows with GYN: no.   - Menopause: premenopausal.   - History of STDs: no  - Contraception:. periods last 4 days, irregular; tubes removed      Review of Systems   Constitutional:  Negative for chills and fever.   HENT:  Negative for congestion and rhinorrhea.    Eyes:  Negative for visual disturbance.   Respiratory:  Negative for cough and shortness of breath.    Cardiovascular:  Negative for chest pain and palpitations.   Gastrointestinal:  Negative for  abdominal pain, constipation, diarrhea, nausea and vomiting.   Genitourinary:  Negative for dysuria.   Musculoskeletal:  Negative for arthralgias.   Skin:  Negative for rash.   Neurological:  Negative for dizziness, light-headedness and headaches.     Current Outpatient Medications on File Prior to Visit   Medication Sig Dispense Refill    acetaminophen (TYLENOL) 500 mg tablet Take 1 tablet (500 mg total) by mouth every 8 (eight) hours 90 tablet 1    albuterol (2.5 mg/3 mL) 0.083 % nebulizer solution Inhale      albuterol (ProAir HFA) 90 mcg/act inhaler Inhale 2 puffs every 6 (six) hours as needed for wheezing or shortness of breath 8.5 g 0    albuterol (PROVENTIL HFA,VENTOLIN HFA) 90 mcg/act inhaler Inhale 2 puffs every 6 (six) hours as needed for wheezing      benzonatate (TESSALON) 200 MG capsule Take 1 capsule (200 mg total) by mouth 3 (three) times a day as needed for cough 20 capsule 0    cimetidine (TAGAMET) 400 mg tablet Take 400 mg by mouth 2 (two) times a day      EPINEPHrine (EPIPEN) 0.3 mg/0.3 mL SOAJ INJECT 1 PEN IMMEDIATELY FOR ALLERGIC REACTION FOR 30 DAYS      methocarbamol (ROBAXIN) 500 mg tablet Take 1 tablet (500 mg total) by mouth 3 (three) times a day for 5 days 15 tablet 0    Multiple Vitamins-Minerals (multivitamin with minerals) tablet Take 1 tablet by mouth daily      predniSONE 10 mg tablet Take 4 tablets (40 mg total) by mouth daily for 3 days, THEN 3 tablets (30 mg total) daily for 3 days, THEN 2 tablets (20 mg total) daily for 3 days, THEN 1 tablet (10 mg total) daily for 3 days. 30 tablet 0    [DISCONTINUED] beclomethasone (Qvar RediHaler) 80 MCG/ACT inhaler Inhale 2 puffs 2 (two) times a day Rinse mouth after use. 10.6 g 0    [DISCONTINUED] Xolair subcutaneous injection       [DISCONTINUED] gabapentin (Neurontin) 100 mg capsule Take 1 capsule (100 mg total) by mouth 3 (three) times a day as needed (nerve pain) 90 capsule 0    [DISCONTINUED] phentermine (ADIPEX-P) 37.5 MG tablet Take 1  "tablet (37.5 mg total) by mouth daily as needed (appetite control) 30 tablet 0     No current facility-administered medications on file prior to visit.        Objective   /74 (BP Location: Left arm, Patient Position: Sitting, Cuff Size: Large)   Temp 97.7 °F (36.5 °C) (Temporal)   Resp 18   Ht 5' 1\" (1.549 m)   Wt 118 kg (261 lb)   BMI 49.32 kg/m²     Physical Exam  Vitals reviewed.   Constitutional:       Appearance: She is obese.   HENT:      Head: Normocephalic and atraumatic.      Right Ear: External ear normal.      Left Ear: External ear normal.      Nose: Nose normal.      Mouth/Throat:      Pharynx: Oropharynx is clear.   Eyes:      Extraocular Movements: Extraocular movements intact.      Conjunctiva/sclera: Conjunctivae normal.   Cardiovascular:      Rate and Rhythm: Normal rate and regular rhythm.      Pulses: Normal pulses.      Heart sounds: Normal heart sounds.   Pulmonary:      Effort: Pulmonary effort is normal.      Breath sounds: Normal breath sounds.   Abdominal:      Palpations: Abdomen is soft.   Musculoskeletal:      Cervical back: Neck supple.      Right lower leg: No edema.      Left lower leg: No edema.   Skin:     General: Skin is warm.   Neurological:      Mental Status: She is alert and oriented to person, place, and time.   Psychiatric:         Mood and Affect: Mood normal.         Behavior: Behavior normal.         Thought Content: Thought content normal.         Judgment: Judgment normal.       Administrative Statements   I have spent a total time of 45 minutes in caring for this patient on the day of the visit/encounter including Diagnostic results, Prognosis, Risks and benefits of tx options, Instructions for management, Patient and family education, Importance of tx compliance, Risk factor reductions, Documenting in the medical record, Reviewing / ordering tests, medicine, procedures  , and Obtaining or reviewing history  .   "

## 2025-01-13 NOTE — ASSESSMENT & PLAN NOTE
History of multiple asthma flares over the last year, most recent was about 2 weeks ago, patient followed with urgent care and was placed on a prednisone taper.  She was also prescribed Qvar RediHaler but this was not available at her pharmacy, will send Symbicort.  Patient to start Symbicort daily, complete prednisone taper, has albuterol to use as needed.  Was previous over to pulmonology, patient to schedule appointment due to frequent attacks    Orders:    budesonide-formoterol (Symbicort) 80-4.5 MCG/ACT inhaler; Inhale 2 puffs 2 (two) times a day Rinse mouth after use.

## 2025-01-13 NOTE — PATIENT INSTRUCTIONS
"Patient Education     Routine physical for adults   The Basics   Written by the doctors and editors at [facility]   What is a physical? -- A physical is a routine visit, or \"check-up,\" with your doctor. You might also hear it called a \"wellness visit\" or \"preventive visit.\"  During each visit, the doctor will:   Ask about your physical and mental health   Ask about your habits, behaviors, and lifestyle   Do an exam   Give you vaccines if needed   Talk to you about any medicines you take   Give advice about your health   Answer your questions  Getting regular check-ups is an important part of taking care of your health. It can help your doctor find and treat any problems you have. But it's also important for preventing health problems.  A routine physical is different from a \"sick visit.\" A sick visit is when you see a doctor because of a health concern or problem. Since physicals are scheduled ahead of time, you can think about what you want to ask the doctor.  How often should I get a physical? -- It depends on your age and health. In general, for people age 21 years and older:   If you are younger than 50 years, you might be able to get a physical every 3 years.   If you are 50 years or older, your doctor might recommend a physical every year.  If you have an ongoing health condition, like diabetes or high blood pressure, your doctor will probably want to see you more often.  What happens during a physical? -- In general, each visit will include:   Physical exam - The doctor or nurse will check your height, weight, heart rate, and blood pressure. They will also look at your eyes and ears. They will ask about how you are feeling and whether you have any symptoms that bother you.   Medicines - It's a good idea to bring a list of all the medicines you take to each doctor visit. Your doctor will talk to you about your medicines and answer any questions. Tell them if you are having any side effects that bother you. You " "should also tell them if you are having trouble paying for any of your medicines.   Habits and behaviors - This includes:   Your diet   Your exercise habits   Whether you smoke, drink alcohol, or use drugs   Whether you are sexually active   Whether you feel safe at home  Your doctor will talk to you about things you can do to improve your health and lower your risk of health problems. They will also offer help and support. For example, if you want to quit smoking, they can give you advice and might prescribe medicines. If you want to improve your diet or get more physical activity, they can help you with this, too.   Lab tests, if needed - The tests you get will depend on your age and situation. For example, your doctor might want to check your:   Cholesterol   Blood sugar   Iron level   Vaccines - The recommended vaccines will depend on your age, health, and what vaccines you already had. Vaccines are very important because they can prevent certain serious or deadly infections.   Discussion of screening - \"Screening\" means checking for diseases or other health problems before they cause symptoms. Your doctor can recommend screening based on your age, risk, and preferences. This might include tests to check for:   Cancer, such as breast, prostate, cervical, ovarian, colorectal, prostate, lung, or skin cancer   Sexually transmitted infections, such as chlamydia and gonorrhea   Mental health conditions like depression and anxiety  Your doctor will talk to you about the different types of screening tests. They can help you decide which screenings to have. They can also explain what the results might mean.   Answering questions - The physical is a good time to ask the doctor or nurse questions about your health. If needed, they can refer you to other doctors or specialists, too.  Adults older than 65 years often need other care, too. As you get older, your doctor will talk to you about:   How to prevent falling at " "home   Hearing or vision tests   Memory testing   How to take your medicines safely   Making sure that you have the help and support you need at home  All topics are updated as new evidence becomes available and our peer review process is complete.  This topic retrieved from [employer] on: May 02, 2024.  Topic 938335 Version 1.0  Release: 32.4.3 - C32.122  © 2024 UpToDate, Inc. and/or its affiliates. All rights reserved.  Consumer Information Use and Disclaimer   Disclaimer: This generalized information is a limited summary of diagnosis, treatment, and/or medication information. It is not meant to be comprehensive and should be used as a tool to help the user understand and/or assess potential diagnostic and treatment options. It does NOT include all information about conditions, treatments, medications, side effects, or risks that may apply to a specific patient. It is not intended to be medical advice or a substitute for the medical advice, diagnosis, or treatment of a health care provider based on the health care provider's examination and assessment of a patient's specific and unique circumstances. Patients must speak with a health care provider for complete information about their health, medical questions, and treatment options, including any risks or benefits regarding use of medications. This information does not endorse any treatments or medications as safe, effective, or approved for treating a specific patient. UpToDate, Inc. and its affiliates disclaim any warranty or liability relating to this information or the use thereof.The use of this information is governed by the Terms of Use, available at https://www.wolterskluwer.com/en/know/clinical-effectiveness-terms. 2024© UpToDate, Inc. and its affiliates and/or licensors. All rights reserved.  Copyright   © 2024 UpToDate, Inc. and/or its affiliates. All rights reserved.    Patient Education     Weight Loss Diet   About this topic   There are many \"trendy\" " weight loss diets that are popular today. Many of these diets can end up being more harmful than helpful. The healthiest way to lose weight is to burn more calories than you eat.  A weight loss diet should help you have a healthy view of eating. It is NOT healthy to stop eating to try and lose weight. A good diet plan will help you cut down your food intake and make healthy choices.  A healthy weight loss goal is 1 to 2 pounds (0.5 to 1 kg) per week. Reducing calories in your diet, burning calories through exercise, or both can help you lose weight. Combining a healthy diet with regular physical activity can help you get the best results.  To cut calories in your diet you can:  Switch from whole milk to 1% or skim milk.  Switch from regular cheese to low-fat or fat-free cheese.  Use healthier condiment choices:  Fat-free or low-fat sour cream or salad dressings  Spray butter  Diet syrups or jellies over regular  Try frozen yogurt as a dessert rather than eating ice cream.  Skip the chips. Snack on carrots, vegetables, or fruit. If chips are a favorite of yours, try the baked style and watch portion size.  Eat grilled, roasted, boiled, broiled, or baked meats. Avoid deep-frying. Choose skinless poultry, lean red meat, lean cuts of pork, and fish for good protein sources.  Try flavored no-calorie sidhu. Do not drink soda and juices that have many calories.  Choose fruit instead of sweets.  General   Eating smaller meals more often may be helpful. This will keep you from overeating at your next meal. Also, eating meals slowly helps you feel full faster.  If eating 3 meals is a part of your lifestyle, choose more lean proteins and higher fiber foods to fill you up at each meal.  Do not skip meals. Most often if you skip a meal, you eat too much at the next meal.  Eat smaller portions. Use a smaller plate or bowl for meals, and when you are eating out, eat half and take the rest home.  Plan ahead. Plan your meals and  grocery list before going to the store. Planning will keep you from getting meals from restaurants.  Do not go to the grocery store hungry. You are more likely to buy snacks that are not good for you.  Portion out snacks. When you are having a snack, instead of grabbing the whole bag, portion a small amount out to give yourself a stopping point.  Drink water before and after your meals to help fill you up without the calories.  When eating starchy foods, choose whole-grain products. These have a lot of fiber which will make you feel full. Fiber also helps lower cholesterol and helps with bowel function.  If you need a helpful start, ask your doctor to send you to a dietitian for weight loss help.         What will the results be?   Losing excess weight will make your whole body healthier. You will have more energy for your daily activities and lower your risk for health problems.  What lifestyle changes are needed?   Stay active. Eating healthy is not always enough to lose weight. Burning calories by exercising is a big part of weight loss.  What foods are good to eat?   The key is to watch your portion sizes. It is best to choose foods that are lower in fat and calories.  Choose lean meats:  Boneless, skinless chicken breast  Pork loin  90% lean beef  Lean turkey meat  Fresh fish (not fried)  Choose low-fat dairy products:  1% or skim milk  Spray butter or margarine  Low-fat or fat-free cheese  Frozen yogurt or low-calorie ice cream  Choose fresh fruits, vegetables, beans and lentils, and whole wheat products more often.  Choose water to drink more often. Drink diet or no-calorie beverages when you want something other than water. Aim to get your calories from the foods you eat.  Choose smart snacks:  Fruits  Vegetables  Low-fat or nonfat yogurt  Low-fat or no-fat cheese, such as cottage cheese  Unsalted nuts  Hard-boiled egg  Hummus  Guacamole  Natural peanut butter  Popcorn with no butter - use pepper, garlic, or  another spice to taste  Whole grain crackers  What foods should be limited or avoided?   Limit high-fat, high-sodium, and high-calorie foods like:  Fried foods  Processed meats  Whole-fat dairy products  Candy, cookies, chips, pastries  Sausage, rey, any full-fat meats  Soda, juice  Beer, wine, and mixed drinks (alcohol)  Will there be any other care needed?   What do I do first before trying to lose weight?  Talk to your doctor and dietitian to see if you need to lose weight. Work with them to set your weight loss goals.  If you have a chronic illness, such as high blood sugar or high blood pressure, ask a doctor or dietitian what diet and exercise is right for you.  Ask your doctor about how much you are able to exercise and what type of exercise is good for you.  Helpful tips   Keep a food journal to help keep you on track.  Join a support group.  Tips for burning calories:  If your workplace is near your house, choose to walk or bike to work instead of driving.  Take 20-minute walks each day. Walk around during your lunch break. You will not only burn calories, but will raise your energy for the rest of the day.  Take the stairs over the elevator.  Join a gym or exercise class with a friend.  Try to exercise 30 minutes a day for overall health. Three 10-minute sessions work too. Aim for 60 to 90 minutes a day to lose weight.  Drink lots of water before, during, and after exercise.  Last Reviewed Date   2021-06-24  Consumer Information Use and Disclaimer   This generalized information is a limited summary of diagnosis, treatment, and/or medication information. It is not meant to be comprehensive and should be used as a tool to help the user understand and/or assess potential diagnostic and treatment options. It does NOT include all information about conditions, treatments, medications, side effects, or risks that may apply to a specific patient. It is not intended to be medical advice or a substitute for the  medical advice, diagnosis, or treatment of a health care provider based on the health care provider's examination and assessment of a patient’s specific and unique circumstances. Patients must speak with a health care provider for complete information about their health, medical questions, and treatment options, including any risks or benefits regarding use of medications. This information does not endorse any treatments or medications as safe, effective, or approved for treating a specific patient. UpToDate, Inc. and its affiliates disclaim any warranty or liability relating to this information or the use thereof. The use of this information is governed by the Terms of Use, available at https://www.Broad[employer].com/en/know/clinical-effectiveness-terms   Copyright   Copyright © 2024 UpToDate, Inc. and its affiliates and/or licensors. All rights reserved.

## 2025-01-15 ENCOUNTER — PATIENT MESSAGE (OUTPATIENT)
Dept: FAMILY MEDICINE CLINIC | Facility: CLINIC | Age: 40
End: 2025-01-15

## 2025-01-15 DIAGNOSIS — E66.813 CLASS 3 SEVERE OBESITY WITH BODY MASS INDEX (BMI) OF 45.0 TO 49.9 IN ADULT, UNSPECIFIED OBESITY TYPE, UNSPECIFIED WHETHER SERIOUS COMORBIDITY PRESENT (HCC): Primary | ICD-10-CM

## 2025-01-15 DIAGNOSIS — E66.01 CLASS 3 SEVERE OBESITY WITH BODY MASS INDEX (BMI) OF 45.0 TO 49.9 IN ADULT, UNSPECIFIED OBESITY TYPE, UNSPECIFIED WHETHER SERIOUS COMORBIDITY PRESENT (HCC): Primary | ICD-10-CM

## 2025-01-16 ENCOUNTER — TELEPHONE (OUTPATIENT)
Age: 40
End: 2025-01-16

## 2025-01-16 RX ORDER — TIRZEPATIDE 2.5 MG/.5ML
2.5 INJECTION, SOLUTION SUBCUTANEOUS WEEKLY
Qty: 2 ML | Refills: 0 | Status: SHIPPED | OUTPATIENT
Start: 2025-01-16 | End: 2025-02-13

## 2025-01-16 NOTE — TELEPHONE ENCOUNTER
Reason for call:   [x] Prior Auth  [] Other:     Caller:  [x] Patient  [x] Pharmacy  Name:    [facility]/pharmacy #[id number] - [place] - [address]      Medication:     tirzepatide (Zepbound) 2.5 mg/0.5 mL auto-injector       Dose/Frequency: : Inject 0.5 mL (2.5 mg total) under the skin once a week for 28 days     Quantity: 2 mL    Ordering Provider:   [x] PCP/Provider -   [] Speciality/Provider -

## 2025-01-20 NOTE — TELEPHONE ENCOUNTER
PA Zepbound 2.5 MG/0.5ML SUBMITTED     to [employer]     via    []CMM-KEY:    [x]Surescripts-Case ID # [id number]   []Availity-Auth ID #  NDC #    []Faxed to plan   []Other website    []Phone call Case ID #      []PA sent as URGENT    All office notes, labs and other pertaining documents and studies sent. Clinical questions answered. Awaiting determination from insurance company.     Turnaround time for your insurance to make a decision on your Prior Authorization can take 7-21 business days.

## 2025-01-21 NOTE — TELEPHONE ENCOUNTER
PA Zepbound 2.5 MG/0.5ML APPROVED         Patient advised by          [x]MyChart Message  []Phone call   []LMOM  []L/M to call office as no active Communication consent on file  []Unable to leave detailed message as VM not approved on Communication consent       Pharmacy advised by    [x]Fax  []Phone call

## 2025-01-30 ENCOUNTER — APPOINTMENT (OUTPATIENT)
Dept: LAB | Facility: CLINIC | Age: 40
End: 2025-01-30
Payer: COMMERCIAL

## 2025-01-30 DIAGNOSIS — R73.03 PREDIABETES: ICD-10-CM

## 2025-01-30 DIAGNOSIS — E78.5 DYSLIPIDEMIA: ICD-10-CM

## 2025-01-30 DIAGNOSIS — L65.9 HAIR THINNING: ICD-10-CM

## 2025-01-30 LAB
ANION GAP SERPL CALCULATED.3IONS-SCNC: 11 MMOL/L (ref 4–13)
BUN SERPL-MCNC: 15 MG/DL (ref 5–25)
CALCIUM SERPL-MCNC: 9.8 MG/DL (ref 8.4–10.2)
CHLORIDE SERPL-SCNC: 100 MMOL/L (ref 96–108)
CHOLEST SERPL-MCNC: 165 MG/DL (ref ?–200)
CO2 SERPL-SCNC: 27 MMOL/L (ref 21–32)
CREAT SERPL-MCNC: 0.95 MG/DL (ref 0.6–1.3)
EST. AVERAGE GLUCOSE BLD GHB EST-MCNC: 140 MG/DL
GFR SERPL CREATININE-BSD FRML MDRD: 74 ML/MIN/1.73SQ M
GLUCOSE P FAST SERPL-MCNC: 103 MG/DL (ref 65–99)
HBA1C MFR BLD: 6.5 %
HDLC SERPL-MCNC: 56 MG/DL
LDLC SERPL CALC-MCNC: 95 MG/DL (ref 0–100)
NONHDLC SERPL-MCNC: 109 MG/DL
POTASSIUM SERPL-SCNC: 4.7 MMOL/L (ref 3.5–5.3)
SODIUM SERPL-SCNC: 138 MMOL/L (ref 135–147)
TRIGL SERPL-MCNC: 70 MG/DL (ref ?–150)
TSH SERPL DL<=0.05 MIU/L-ACNC: 1.77 UIU/ML (ref 0.45–4.5)

## 2025-01-30 PROCEDURE — 80048 BASIC METABOLIC PNL TOTAL CA: CPT

## 2025-01-30 PROCEDURE — 36415 COLL VENOUS BLD VENIPUNCTURE: CPT

## 2025-01-30 PROCEDURE — 83036 HEMOGLOBIN GLYCOSYLATED A1C: CPT

## 2025-01-30 PROCEDURE — 80061 LIPID PANEL: CPT

## 2025-01-30 PROCEDURE — 84443 ASSAY THYROID STIM HORMONE: CPT

## 2025-02-04 ENCOUNTER — RESULTS FOLLOW-UP (OUTPATIENT)
Dept: FAMILY MEDICINE CLINIC | Facility: CLINIC | Age: 40
End: 2025-02-04

## 2025-02-21 ENCOUNTER — TELEPHONE (OUTPATIENT)
Dept: FAMILY MEDICINE CLINIC | Facility: CLINIC | Age: 40
End: 2025-02-21

## 2025-02-21 DIAGNOSIS — E66.813 CLASS 3 SEVERE OBESITY WITH BODY MASS INDEX (BMI) OF 45.0 TO 49.9 IN ADULT, UNSPECIFIED OBESITY TYPE, UNSPECIFIED WHETHER SERIOUS COMORBIDITY PRESENT (HCC): Primary | ICD-10-CM

## 2025-02-21 DIAGNOSIS — E66.01 CLASS 3 SEVERE OBESITY WITH BODY MASS INDEX (BMI) OF 45.0 TO 49.9 IN ADULT, UNSPECIFIED OBESITY TYPE, UNSPECIFIED WHETHER SERIOUS COMORBIDITY PRESENT (HCC): Primary | ICD-10-CM

## 2025-02-21 DIAGNOSIS — E66.01 CLASS 3 SEVERE OBESITY WITH BODY MASS INDEX (BMI) OF 45.0 TO 49.9 IN ADULT, UNSPECIFIED OBESITY TYPE, UNSPECIFIED WHETHER SERIOUS COMORBIDITY PRESENT (HCC): ICD-10-CM

## 2025-02-21 DIAGNOSIS — E66.813 CLASS 3 SEVERE OBESITY WITH BODY MASS INDEX (BMI) OF 45.0 TO 49.9 IN ADULT, UNSPECIFIED OBESITY TYPE, UNSPECIFIED WHETHER SERIOUS COMORBIDITY PRESENT (HCC): ICD-10-CM

## 2025-02-21 RX ORDER — TIRZEPATIDE 2.5 MG/.5ML
2.5 INJECTION, SOLUTION SUBCUTANEOUS WEEKLY
OUTPATIENT
Start: 2025-02-21 | End: 2025-03-21

## 2025-02-21 NOTE — TELEPHONE ENCOUNTER
Patient called the RX Refill Line. Message is being forwarded to the office.     Patient requesting refill of Zepound injection. Patient requesting to be placed on next dose (5 mg) and have new script sent to [facility] pharmacy. Patient ok with phone call to further discuss if needed.

## 2025-02-24 RX ORDER — TIRZEPATIDE 5 MG/.5ML
5 INJECTION, SOLUTION SUBCUTANEOUS WEEKLY
Qty: 2 ML | Refills: 0 | Status: SHIPPED | OUTPATIENT
Start: 2025-02-24

## 2025-02-24 NOTE — TELEPHONE ENCOUNTER
Patient called to check on request for their Zepbound advised a refill was requested on 2/21 and is pending approval. Patient verbalized understanding and is in agreement.     Does the patient have enough for 3 days?   [] Yes   [x] No - Send as HP to POD     Last injection on 2/24. Looking to increase to next dose

## 2025-02-26 ENCOUNTER — CONSULT (OUTPATIENT)
Dept: PULMONOLOGY | Facility: CLINIC | Age: 40
End: 2025-02-26
Payer: COMMERCIAL

## 2025-02-26 VITALS
HEART RATE: 80 BPM | SYSTOLIC BLOOD PRESSURE: 126 MMHG | TEMPERATURE: 97.9 F | HEIGHT: 61 IN | OXYGEN SATURATION: 97 % | DIASTOLIC BLOOD PRESSURE: 68 MMHG | BODY MASS INDEX: 48.26 KG/M2 | WEIGHT: 255.6 LBS

## 2025-02-26 DIAGNOSIS — R29.818 SUSPECTED SLEEP APNEA: ICD-10-CM

## 2025-02-26 DIAGNOSIS — E66.01 MORBID OBESITY (HCC): ICD-10-CM

## 2025-02-26 DIAGNOSIS — J45.40 MODERATE PERSISTENT ASTHMA WITHOUT COMPLICATION: Primary | ICD-10-CM

## 2025-02-26 DIAGNOSIS — J45.31 MILD PERSISTENT ASTHMA WITH ACUTE EXACERBATION: ICD-10-CM

## 2025-02-26 PROCEDURE — 94010 BREATHING CAPACITY TEST: CPT | Performed by: INTERNAL MEDICINE

## 2025-02-26 PROCEDURE — 95012 NITRIC OXIDE EXP GAS DETER: CPT | Performed by: INTERNAL MEDICINE

## 2025-02-26 PROCEDURE — 99244 OFF/OP CNSLTJ NEW/EST MOD 40: CPT | Performed by: INTERNAL MEDICINE

## 2025-02-26 NOTE — PROGRESS NOTES
Consultation - Pulmonary Medicine   Name: Yvonne Delgado      : 1985      MRN: [id number]  Encounter Provider: Chasity Hamm MD  Encounter Date: 2025   Encounter department: [facility] [place]    Requesting Provider:   Laly Chan  [address] [place] [address]   Reason for Consult: Moderate Persistent Asthma:  Assessment & Plan  Moderate persistent asthma without complication  Patient with longstanding history of asthma, severe food and environmental allergies with noted increased flares over the past year.   In office spirometry significant for mild obstructive impairment.  IgE 597 ()   Absolute Eosinophils 0.15 << 0.12  Pt currently on albuterol as needed. Will start on Symbicort and to continue with albuterol as rescue inhaler.   Orders:    POCT FeNO    POCT spirometry    Morbid obesity (HCC)  On Zepbound  Pt counseled on weight loss management, has joined the gym and is trying to modify her diet.        Suspected sleep apnea  STOP-BANG elevated @ 4 (snoring, elevated BMI, neck circumference, tiredness)  Will hold off on sleep study at this time.   Pt to continue with above weight loss management         Recommendations  FENO suggestive of eosinophilic inflammation.   In office spirometry reflective of mild obstructive impairment.   Will start patient on Symbicort and to continue with albuterol as needed  Antihistamines as needed for allergies.   Will F/U in 6-8 weeks.   Will hold off on sleep study  Patient encouraged to continue with diet modification and exercise.   Counseled on sleep hygiene  Encouraged to get compression socks and for lower extremity elevation for lower extremity swelling.     History of Present Illness   HPI:  Yvonne Delgado is a 39 y.o. female former tobacco use (4 ciggs a day for approximately 20 years quit 4 years prior), morbid obesity on Zepbound, prediabetes, dyslipidemia, who presents as a new visit for her  uncontrolled asthma.    Patient reports that over the past year has had multiple urgent care visits for asthma flare most recently on 1/5 given 2-week history of cough, nonproductive in nature, shortness of breath and wheezing. X-ray obtained reviewed which revealed no clear consolidation. Patient was prescribed Qvar inhaler, albuterol and a prednisone taper. Qvar not obtained due to unavailability at pharmacy followed up with PCP who prescribed Symbicort however has not been using same as she wanted to wait until seeing me.     Reports that she currently she has shortness of breath upon moderate exertion for example with ascending a flight of stairs accompanied by wheezing and chest tightness. She denies chest pain, palpitations. Notes B/L leg swelling over the past year with prolonged standing, has not used compression stockings, denies orthopnea or PND.  Has been using albuterol up to 4 times daily with no nighttime awakenings/cough.    In regards to her Asthma, she was diagnosed as a teen, 1 prior hospitalization, no prior intubations.   Only used albuterol inh as rescue inhaler. Reports flares normally with cold weather, allergic season for which she will need to use Albuterol inhaler more frequently. At baseline, will not require albuterol inh. Pt previously saw Allergist Dr. Batres, multiple food and evironmental allergies noted, previously on Xolair for Asthma and Allergies discontinued due to patient discomfort. Notes to have nasal polyps. Pt previously on steroids for 4 months 3 years ago which she thinks caused her to put on a lot of weight.  Denies any recent change to her environment or exposure to mold or dust.    Otherwise she has 2 dogs - not allergic, shed a lot. Denies birds. Reports mother with Asthma.   Reports Grandmother, 2 Aunts and 1 of her sisters have had pulmonary fibrosis, no premature greying noted.     In regards to her obesity, she has been on Zepbound for approximately 1 month for  which she has lost about 10 lbs. Joined gym yesterday. Has attempted to modify her diet. Reports snoring at night, witnessed apneic episodes, has headaches throughout the day, feels tired per baseline due to inadequate sleep at night.    Review of systems:  12 point review of systems was completed and was otherwise negative except as listed in HPI.    Historical Information   Past Medical History:   Diagnosis Date    Allergic     Asthma     GERD (gastroesophageal reflux disease)     Headache(784.0) 1990    Morbid obesity with BMI of 40.0-44.9, adult ([facility])     Obesity 1990     Past Surgical History:   Procedure Laterality Date    CHOLECYSTECTOMY      TONSILECTOMY AND ADNOIDECTOMY      TUBAL LIGATION       Family History   Problem Relation Age of Onset    Diabetes Mother     Hypertension Mother     Alcohol abuse Mother     Asthma Mother     Arthritis Mother     Drug abuse Mother     Hypertension Father     Cancer Father         melanoma     Alcohol abuse Father     Seizures Sister     No Known Problems Daughter     No Known Problems Daughter     Diabetes Maternal Grandmother     Hypertension Maternal Grandmother     Cancer Maternal Grandmother         lung cancer    Heart disease Maternal Grandmother     Glaucoma Maternal Grandmother     No Known Problems Maternal Grandfather     No Known Problems Paternal Grandmother     No Known Problems Paternal Grandfather     Breast cancer Maternal Aunt 50    Breast cancer Maternal Aunt 40    No Known Problems Maternal Aunt     No Known Problems Maternal Aunt     No Known Problems Son     No Known Problems Half-Sister     No Known Problems Half-Sister     No Known Problems Half-Sister     No Known Problems Half-Sister     No Known Problems Half-Brother     No Known Problems Half-Brother     No Known Problems Paternal Aunt     No Known Problems Paternal Aunt     No Known Problems Paternal Uncle     Autoimmune disease Daughter         She has Dermatomyositis    Bipolar disorder  Maternal Aunt     Thyroid disease Neg Hx     Stroke Neg Hx        Occupational History:  Phlebotomist    Social History: as above  Social History     Tobacco Use   Smoking Status Former    Current packs/day: 0.00    Average packs/day: 0.3 packs/day for 20.9 years (5.2 ttl pk-yrs)    Types: Cigarettes    Start date: 10/16/1999    Quit date: 2020    Years since quittin.4   Smokeless Tobacco Never   Tobacco Comments    I quit already in sept.       Meds/Allergies     Current Outpatient Medications:     acetaminophen (TYLENOL) 500 mg tablet, Take 1 tablet (500 mg total) by mouth every 8 (eight) hours, Disp: 90 tablet, Rfl: 1    albuterol (2.5 mg/3 mL) 0.083 % nebulizer solution, Inhale, Disp: , Rfl:     albuterol (ProAir HFA) 90 mcg/act inhaler, Inhale 2 puffs every 6 (six) hours as needed for wheezing or shortness of breath, Disp: 8.5 g, Rfl: 0    albuterol (PROVENTIL HFA,VENTOLIN HFA) 90 mcg/act inhaler, Inhale 2 puffs every 6 (six) hours as needed for wheezing, Disp: , Rfl:     benzonatate (TESSALON) 200 MG capsule, Take 1 capsule (200 mg total) by mouth 3 (three) times a day as needed for cough, Disp: 20 capsule, Rfl: 0    budesonide-formoterol (Symbicort) 80-4.5 MCG/ACT inhaler, Inhale 2 puffs 2 (two) times a day Rinse mouth after use., Disp: 10.2 g, Rfl: 1    cimetidine (TAGAMET) 400 mg tablet, Take 400 mg by mouth 2 (two) times a day, Disp: , Rfl:     EPINEPHrine (EPIPEN) 0.3 mg/0.3 mL SOAJ, INJECT 1 PEN IMMEDIATELY FOR ALLERGIC REACTION FOR 30 DAYS, Disp: , Rfl:     methocarbamol (ROBAXIN) 500 mg tablet, Take 1 tablet (500 mg total) by mouth 3 (three) times a day for 5 days, Disp: 15 tablet, Rfl: 0    Multiple Vitamins-Minerals (multivitamin with minerals) tablet, Take 1 tablet by mouth daily, Disp: , Rfl:     tirzepatide (Zepbound) 5 mg/0.5 mL auto-injector, Inject 0.5 mL (5 mg total) under the skin once a week, Disp: 2 mL, Rfl: 0  Allergies   Allergen Reactions    Milk (Cow) Angioedema    Shrimp Extract  Allergy Skin Test - Food Allergy Angioedema    Banana - Food Allergy Other (See Comments)     ulcerations    Fruit Extracts      Annotation - [id number]: bananas, kiwi, pineapples    Kiwi Extract - Food Allergy Other (See Comments)     tongue swelling    Latex Other (See Comments)     pt warned to avoid due to food allergies    Pineapple - Food Allergy Hives    Pollen Extract        Vitals: There were no vitals taken for this visit., There is no height or weight on file to calculate BMI.      Physical Exam  General: No acute distress  HENT: Normocephalic, atraumatic.  PERRL, EOMI, Moist mucous membranes.  Neck: Supple  Lungs: Clear to auscultation bilaterally, no wheezes, rales, rhonchi.  On room air  Heart: Regular rate and rhythm, S1-S2 present, no murmurs rubs or gallops  Abdomen: Soft, nontender, nondistended, no organomegaly  Extremities: Warm and well perfused, no cyanosis, clubbing, or edema  Pulses: 2+ and symmetric  Skin: Warm, dry, no rashes or lesions  Neurologic: No focal neurologic deficits    Labs: I have personally reviewed pertinent lab results.  Lab Results   Component Value Date    WBC 5.22 12/29/2023    HGB 12.6 12/29/2023    HCT 41.5 12/29/2023    MCV 83 12/29/2023     12/29/2023     Lab Results   Component Value Date    GLUCOSE 86 05/19/2014    CALCIUM 9.8 01/30/2025     05/19/2014    K 4.7 01/30/2025    CO2 27 01/30/2025     01/30/2025    BUN 15 01/30/2025    CREATININE 0.95 01/30/2025     Lab Results   Component Value Date     (H) 12/08/2020     Lab Results   Component Value Date    ALT 15 12/29/2023    AST 18 12/29/2023    GGT 35 05/27/2021    ALKPHOS 58 12/29/2023    BILITOT 0.34 05/19/2014     Imaging and other studies: Results Review Statement: I personally reviewed the following image studies/reports in PACS and discussed pertinent findings with Radiology: chest xray. My interpretation of the radiology images/reports is:  .  XR reviewed, no acute  "abnormalities  Pulmonary function testing:  No results found for: \"FEV1\", \"FVC\", \"AST9QVW\", \"TLC\", \"DLCO\"      EKG, Pathology, and Other Studies: Results Review Statement: I personally reviewed the following image studies/reports in PACS and discussed pertinent findings with Radiology: chest xray. My interpretation of the radiology images/reports is:  .  As above    Disclaimer: Portions of the record may have been created with voice recognition software. Occasional wrong word or \"sound a like\" substitutions may have occurred due to the inherent limitations of voice recognition software. Careful consideration should be taken to recognize, using context, where substitutions have occurred.    Chasity Hamm MD  Pulmonary & Critical Care Medicine Fellow PGY-IV  [facility]    "

## 2025-03-14 ENCOUNTER — HOSPITAL ENCOUNTER (OUTPATIENT)
Dept: RADIOLOGY | Facility: MEDICAL CENTER | Age: 40
Discharge: HOME | End: 2025-03-14
Payer: COMMERCIAL

## 2025-03-14 VITALS — WEIGHT: 248 LBS | BODY MASS INDEX: 46.82 KG/M2 | HEIGHT: 61 IN

## 2025-03-14 DIAGNOSIS — Z12.31 ENCOUNTER FOR SCREENING MAMMOGRAM FOR BREAST CANCER: ICD-10-CM

## 2025-03-14 PROCEDURE — 77063 BREAST TOMOSYNTHESIS BI: CPT

## 2025-03-14 PROCEDURE — 77067 SCR MAMMO BI INCL CAD: CPT

## 2025-03-21 ENCOUNTER — TELEPHONE (OUTPATIENT)
Dept: FAMILY MEDICINE CLINIC | Facility: CLINIC | Age: 40
End: 2025-03-21

## 2025-03-21 ENCOUNTER — APPOINTMENT (OUTPATIENT)
Dept: LAB | Facility: CLINIC | Age: 40
End: 2025-03-21

## 2025-03-21 DIAGNOSIS — Z00.8 HEALTH EXAMINATION IN POPULATION SURVEY: ICD-10-CM

## 2025-03-21 DIAGNOSIS — E66.813 CLASS 3 SEVERE OBESITY WITH BODY MASS INDEX (BMI) OF 45.0 TO 49.9 IN ADULT, UNSPECIFIED OBESITY TYPE, UNSPECIFIED WHETHER SERIOUS COMORBIDITY PRESENT (HCC): ICD-10-CM

## 2025-03-21 DIAGNOSIS — E66.01 CLASS 3 SEVERE OBESITY WITH BODY MASS INDEX (BMI) OF 45.0 TO 49.9 IN ADULT, UNSPECIFIED OBESITY TYPE, UNSPECIFIED WHETHER SERIOUS COMORBIDITY PRESENT (HCC): ICD-10-CM

## 2025-03-21 LAB
CHOLEST SERPL-MCNC: 140 MG/DL (ref ?–200)
EST. AVERAGE GLUCOSE BLD GHB EST-MCNC: 114 MG/DL
HBA1C MFR BLD: 5.6 %
HDLC SERPL-MCNC: 46 MG/DL
LDLC SERPL CALC-MCNC: 79 MG/DL (ref 0–100)
NONHDLC SERPL-MCNC: 94 MG/DL
TRIGL SERPL-MCNC: 75 MG/DL (ref ?–150)

## 2025-03-21 PROCEDURE — 80061 LIPID PANEL: CPT

## 2025-03-21 PROCEDURE — 83036 HEMOGLOBIN GLYCOSYLATED A1C: CPT

## 2025-03-21 PROCEDURE — 36415 COLL VENOUS BLD VENIPUNCTURE: CPT

## 2025-03-21 RX ORDER — TIRZEPATIDE 5 MG/.5ML
5 INJECTION, SOLUTION SUBCUTANEOUS WEEKLY
Qty: 2 ML | Refills: 0 | Status: CANCELLED | OUTPATIENT
Start: 2025-03-21

## 2025-03-21 NOTE — TELEPHONE ENCOUNTER
Pt calling to request the next dose up for her Zepbound injections.   Sent to [facility] on file

## 2025-03-26 NOTE — TELEPHONE ENCOUNTER
Patient is calling again about zepbound being sent in. Patient called 3/21/25 and nothing has been sent to the pharmacy and patient needs this for Saturday

## 2025-03-27 RX ORDER — TIRZEPATIDE 7.5 MG/.5ML
7.5 INJECTION, SOLUTION SUBCUTANEOUS WEEKLY
Qty: 2 ML | Refills: 0 | Status: SHIPPED | OUTPATIENT
Start: 2025-03-27

## 2025-04-14 ENCOUNTER — OFFICE VISIT (OUTPATIENT)
Dept: FAMILY MEDICINE CLINIC | Facility: CLINIC | Age: 40
End: 2025-04-14
Payer: COMMERCIAL

## 2025-04-14 VITALS
SYSTOLIC BLOOD PRESSURE: 106 MMHG | WEIGHT: 246 LBS | BODY MASS INDEX: 46.44 KG/M2 | DIASTOLIC BLOOD PRESSURE: 72 MMHG | HEART RATE: 83 BPM | OXYGEN SATURATION: 99 % | HEIGHT: 61 IN

## 2025-04-14 DIAGNOSIS — J45.20 MILD INTERMITTENT ASTHMA WITHOUT COMPLICATION: ICD-10-CM

## 2025-04-14 DIAGNOSIS — E66.813 CLASS 3 SEVERE OBESITY DUE TO EXCESS CALORIES WITHOUT SERIOUS COMORBIDITY WITH BODY MASS INDEX (BMI) OF 45.0 TO 49.9 IN ADULT: Primary | ICD-10-CM

## 2025-04-14 DIAGNOSIS — L98.9 SKIN LESION OF NECK: ICD-10-CM

## 2025-04-14 PROCEDURE — 99214 OFFICE O/P EST MOD 30 MIN: CPT

## 2025-04-14 RX ORDER — LEVOCETIRIZINE DIHYDROCHLORIDE 5 MG/1
5 TABLET, FILM COATED ORAL EVERY EVENING
COMMUNITY

## 2025-04-14 NOTE — PROGRESS NOTES
Name: Yvonne Delgado      : 1985      MRN: [id number]  Encounter Provider: Brant Alonso DO  Encounter Date: 2025   Encounter department: [facility]  :  Assessment & Plan  Class 3 severe obesity due to excess calories without serious comorbidity with body mass index (BMI) of 45.0 to 49.9 in adult (HCC)  Current weight: 246 lbs; has lost about 15 lbs since last visit  Currently on Zepbound 7.5 mg weekly. Developed some constipation, managing with Miralax. Stays active - riding bike more often, gym about 3 days weekly. Monitoring diet - increased protein and vegetable intake. Will increase zepbound as tolerting, pt will reach out when she is due for refill       Skin lesion of neck  Noted skin lesion on R neck, pt would like to removed via derm, referral provided. Family hx of melanoma in father.  Orders:    Ambulatory Referral to Dermatology; Future    Mild intermittent asthma without complication  Followed with pulm since last visit, was recommended to start Symbicort BID and use albuterol prn. Has noticed improvement in symptoms with current regimen. Uses Xysal for allergies.          F/u 6 months or sooner if needed.      History of Present Illness   HPI    Here for 3-month follow-up/weight check.  Started on 7 pounds in the last visit, currently on 7.5 mg weekly.  Reports some constipation recently and has started on MiraLAX twice a day.  Typically has bowel movement every other day.  Has been riding her bike outside more, did a 10 mile bike ride last week.  Goes to gym about 3 days a week.  Has been monitoring diet increasing protein and vegetable intake.    Did follow with pulmonology for history of moderate persistent asthma and was also recommended to start Symbicort twice a day which has helped with symptom management.  Patient did report a history of pulmonary fibrosis in her mom and sister.  Has been taking Xyzal for her allergies.    Review of Systems   Constitutional:   "Negative for chills and fever.   HENT:  Negative for congestion and rhinorrhea.    Eyes:  Negative for visual disturbance.   Respiratory:  Negative for cough and shortness of breath.    Cardiovascular:  Negative for chest pain and palpitations.   Gastrointestinal:  Negative for abdominal pain, constipation, diarrhea, nausea and vomiting.   Genitourinary:  Negative for dysuria.   Musculoskeletal:  Negative for arthralgias.   Skin:  Negative for rash.   Neurological:  Negative for dizziness, light-headedness and headaches.       Objective   /72 (BP Location: Left arm)   Pulse 83   Ht 5' 1\" (1.549 m)   Wt 112 kg (246 lb)   SpO2 99%   BMI 46.48 kg/m²      Physical Exam  Vitals reviewed.   Constitutional:       Appearance: She is obese.   HENT:      Head: Normocephalic and atraumatic.      Right Ear: External ear normal.      Left Ear: External ear normal.      Nose: Nose normal.      Mouth/Throat:      Pharynx: Oropharynx is clear.   Eyes:      Extraocular Movements: Extraocular movements intact.      Conjunctiva/sclera: Conjunctivae normal.   Cardiovascular:      Rate and Rhythm: Normal rate and regular rhythm.      Pulses: Normal pulses.      Heart sounds: Normal heart sounds.   Pulmonary:      Effort: Pulmonary effort is normal. No respiratory distress.      Breath sounds: Normal breath sounds. No wheezing, rhonchi or rales.   Abdominal:      Palpations: Abdomen is soft.   Musculoskeletal:      Cervical back: Neck supple.      Right lower leg: No edema.      Left lower leg: No edema.   Skin:     General: Skin is warm.   Neurological:      Mental Status: She is alert and oriented to person, place, and time.   Psychiatric:         Mood and Affect: Mood normal.         Behavior: Behavior normal.         Thought Content: Thought content normal.         Judgment: Judgment normal.         "

## 2025-04-14 NOTE — ASSESSMENT & PLAN NOTE
Followed with pulm since last visit, was recommended to start Symbicort BID and use albuterol prn. Has noticed improvement in symptoms with current regimen. Uses Xysal for allergies.

## 2025-04-14 NOTE — ASSESSMENT & PLAN NOTE
Current weight: 246 lbs; has lost about 15 lbs since last visit  Currently on Zepbound 7.5 mg weekly. Developed some constipation, managing with Miralax. Stays active - riding bike more often, gym about 3 days weekly. Monitoring diet - increased protein and vegetable intake. Will increase zepbound as tolerting, pt will reach out when she is due for refill

## 2025-04-22 ENCOUNTER — TELEPHONE (OUTPATIENT)
Dept: OTHER | Facility: HOSPITAL | Age: 40
End: 2025-04-22

## 2025-04-22 DIAGNOSIS — E66.813 CLASS 3 SEVERE OBESITY DUE TO EXCESS CALORIES WITHOUT SERIOUS COMORBIDITY WITH BODY MASS INDEX (BMI) OF 45.0 TO 49.9 IN ADULT: Primary | ICD-10-CM

## 2025-04-22 RX ORDER — TIRZEPATIDE 10 MG/.5ML
10 INJECTION, SOLUTION SUBCUTANEOUS WEEKLY
Qty: 2 ML | Refills: 0 | Status: SHIPPED | OUTPATIENT
Start: 2025-04-22

## 2025-04-22 NOTE — TELEPHONE ENCOUNTER
Patient called the RX Refill Line. Message is being forwarded to the office.     Patient is requesting a refill on Zepbound. Patient stated she is to increase to Zepbound 10mg. Please send refill to [facility].    Please contact patient at [phone] if any questions    Pharmacy : [id number] [facility]                How are you tolerating the medication?   [] Nausea  [] Vomiting  [] Diarrhea  [] Asymptomatic  [x] Other: mild constipation ,patient taking miralax as instructed by physician and has now subsided    Last visit weight: 246lbs 04/14/25    Current weight:240lbs    Date of last injection:4/19/25    How many injections do you have left: zero

## 2025-05-09 ENCOUNTER — CONSULT (OUTPATIENT)
Dept: PLASTIC SURGERY | Facility: CLINIC | Age: 40
End: 2025-05-09
Payer: COMMERCIAL

## 2025-05-09 VITALS
BODY MASS INDEX: 45.54 KG/M2 | HEIGHT: 61 IN | DIASTOLIC BLOOD PRESSURE: 70 MMHG | TEMPERATURE: 98 F | SYSTOLIC BLOOD PRESSURE: 100 MMHG | WEIGHT: 241.18 LBS | HEART RATE: 74 BPM

## 2025-05-09 DIAGNOSIS — L98.9 SKIN LESION OF NECK: Primary | ICD-10-CM

## 2025-05-09 PROCEDURE — 11102 TANGNTL BX SKIN SINGLE LES: CPT | Performed by: PHYSICIAN ASSISTANT

## 2025-05-09 PROCEDURE — 88305 TISSUE EXAM BY PATHOLOGIST: CPT | Performed by: PATHOLOGY

## 2025-05-09 PROCEDURE — 11301 SHAVE SKIN LESION 0.6-1.0 CM: CPT | Performed by: PHYSICIAN ASSISTANT

## 2025-05-09 PROCEDURE — 99203 OFFICE O/P NEW LOW 30 MIN: CPT | Performed by: PHYSICIAN ASSISTANT

## 2025-05-09 NOTE — PROGRESS NOTES
Assessment/Plan:     Diagnoses and all orders for this visit:    Skin lesion of neck  Pt is here for a changing right neck lesion. On exam dark pigmented papule right neck. Shave biopsy performed. I will call her with her results.     -     Ambulatory Referral to Dermatology  -     Biopsy      Subjective:      Patient ID: Yvonne Delgado is a 40 y.o. female.    HPI    Pt is here for a changing right neck lesion. She states it has been there for years but over the past year it has grown & become darker in color. She is concerned b/c her father  from melanoma. She has a PMH of asthma.     Patient Active Problem List   Diagnosis    Class 3 severe obesity due to excess calories without serious comorbidity with body mass index (BMI) of 45.0 to 49.9 in adult    Allergic rhinitis    Migraine headache    Mild intermittent asthma without complication    Multiple food allergies    Osteochondroma    Vitamin D deficiency    COVID-19    Malaise and fatigue    Benign paroxysmal positional vertigo    Moderate persistent asthma without complication    Suspected sleep apnea    Morbid obesity (HCC)     Allergies   Allergen Reactions    Milk (Cow) Angioedema    Shrimp Extract Allergy Skin Test - Food Allergy Angioedema    Banana - Food Allergy Other (See Comments)     ulcerations    Fruit Extracts      Annotation - [id number]: bananas, kiwi, pineapples    Kiwi Extract - Food Allergy Other (See Comments)     tongue swelling    Latex Other (See Comments)     pt warned to avoid due to food allergies    Pineapple - Food Allergy Hives    Pollen Extract      Current Outpatient Medications on File Prior to Visit   Medication Sig    acetaminophen (TYLENOL) 500 mg tablet Take 1 tablet (500 mg total) by mouth every 8 (eight) hours    albuterol (2.5 mg/3 mL) 0.083 % nebulizer solution Inhale    albuterol (ProAir HFA) 90 mcg/act inhaler Inhale 2 puffs every 6 (six) hours as needed for wheezing or shortness of breath    albuterol (PROVENTIL  HFA,VENTOLIN HFA) 90 mcg/act inhaler Inhale 2 puffs every 6 (six) hours as needed for wheezing    budesonide-formoterol (Symbicort) 80-4.5 MCG/ACT inhaler Inhale 2 puffs 2 (two) times a day Rinse mouth after use.    levocetirizine (XYZAL) 5 MG tablet Take 5 mg by mouth every evening    Multiple Vitamins-Minerals (multivitamin with minerals) tablet Take 1 tablet by mouth daily    tirzepatide (Zepbound) 10 mg/0.5 mL auto-injector Inject 0.5 mL (10 mg total) under the skin once a week     No current facility-administered medications on file prior to visit.     Family History   Problem Relation Age of Onset    Diabetes Mother     Hypertension Mother     Alcohol abuse Mother     Asthma Mother     Arthritis Mother     Drug abuse Mother     Migraines Mother     Miscarriages / Stillbirths Mother     Hypertension Father     Cancer Father         melanoma     Alcohol abuse Father     Seizures Sister     No Known Problems Daughter     No Known Problems Daughter     Diabetes Maternal Grandmother     Hypertension Maternal Grandmother     Cancer Maternal Grandmother         lung cancer    Heart disease Maternal Grandmother     Glaucoma Maternal Grandmother     Heart attack Maternal Grandmother     No Known Problems Maternal Grandfather     No Known Problems Paternal Grandmother     No Known Problems Paternal Grandfather     Breast cancer Maternal Aunt 50    Breast cancer Maternal Aunt 40    No Known Problems Maternal Aunt     No Known Problems Maternal Aunt     No Known Problems Son     No Known Problems Half-Sister     No Known Problems Half-Sister     No Known Problems Half-Sister     No Known Problems Half-Sister     No Known Problems Half-Brother     No Known Problems Half-Brother     No Known Problems Paternal Aunt     No Known Problems Paternal Aunt     No Known Problems Paternal Uncle     Autoimmune disease Daughter         She has Dermatomyositis    Bipolar disorder Maternal Aunt     Autoimmune disease Daughter          She has Dermatomyositis    Bipolar disorder Maternal Aunt     Thyroid disease Neg Hx     Stroke Neg Hx      Past Medical History:   Diagnosis Date    Allergic     Asthma     In the     GERD (gastroesophageal reflux disease)     Headache(784.0)     Migraine     In the     Miscarriage 2013    Morbid obesity with BMI of 40.0-44.9, adult (HCC)     Obesity     Peptic ulceration      Social History     Socioeconomic History    Marital status: /Civil Union     Spouse name: Not on file    Number of children: Not on file    Years of education: Not on file    Highest education level: Not on file   Occupational History    Not on file   Tobacco Use    Smoking status: Former     Current packs/day: 0.00     Average packs/day: 0.3 packs/day for 24.4 years (6.3 ttl pk-yrs)     Types: Cigarettes     Start date: 10/16/1999     Quit date: 2020     Years since quittin.6    Smokeless tobacco: Never    Tobacco comments:     I quit already in sept.   Vaping Use    Vaping status: Never Used   Substance and Sexual Activity    Alcohol use: Yes     Alcohol/week: 1.0 standard drink of alcohol     Types: 1 Glasses of wine per week     Comment: On holidays or special occasions    Drug use: Never    Sexual activity: Yes     Partners: Female, Male     Birth control/protection: Female Sterilization   Other Topics Concern    Not on file   Social History Narrative    Not on file     Social Drivers of Health     Financial Resource Strain: Not on file   Food Insecurity: Not on file   Transportation Needs: Not on file   Physical Activity: Not on file   Stress: Not on file   Social Connections: Not on file   Intimate Partner Violence: Not on file   Housing Stability: Not on file     Past Surgical History:   Procedure Laterality Date    CHOLECYSTECTOMY  2014    TONSILECTOMY AND ADNOIDECTOMY      TONSILLECTOMY  1997    TUBAL LIGATION  2014         Review of Systems   All other systems reviewed and are negative.     "    Objective:      /70   Pulse 74   Temp 98 °F (36.7 °C)   Ht 5' 1\" (1.549 m)   Wt 109 kg (241 lb 2.9 oz)   BMI 45.57 kg/m²          Physical Exam  Constitutional:       Appearance: She is well-developed. She is obese.   HENT:      Head: Normocephalic and atraumatic.   Eyes:      Conjunctiva/sclera: Conjunctivae normal.   Neck:      Comments: Right neck - dark papule, see photo  Pulmonary:      Effort: Pulmonary effort is normal.   Musculoskeletal:         General: Normal range of motion.      Cervical back: Normal range of motion.   Skin:     General: Skin is warm and dry.   Neurological:      Mental Status: She is alert and oriented to person, place, and time.   Psychiatric:         Mood and Affect: Mood normal.         Behavior: Behavior normal.           Biopsy    Date/Time: 5/9/2025 1:30 PM    Performed by: Leticia Fernandez PA-C  Authorized by: Leticia Fernandez PA-C    Universal Protocol:  Consent: Verbal consent obtained.  Consent given by: patient    Procedure Details - Lesion Biopsy:     Body area:  Head/neck    Head/neck location:  Neck    Biopsy method: shave biopsy      Biopsy tissue type: skin      "

## 2025-05-14 PROCEDURE — 88305 TISSUE EXAM BY PATHOLOGIST: CPT | Performed by: PATHOLOGY

## 2025-05-19 ENCOUNTER — TELEPHONE (OUTPATIENT)
Dept: FAMILY MEDICINE CLINIC | Facility: CLINIC | Age: 40
End: 2025-05-19

## 2025-05-19 DIAGNOSIS — E66.813 CLASS 3 SEVERE OBESITY WITH BODY MASS INDEX (BMI) OF 45.0 TO 49.9 IN ADULT, UNSPECIFIED OBESITY TYPE, UNSPECIFIED WHETHER SERIOUS COMORBIDITY PRESENT: Primary | ICD-10-CM

## 2025-05-19 NOTE — TELEPHONE ENCOUNTER
Patient called the RX Refill Line. Message is being forwarded to the office.     Patient requesting refill of Zepbound. Patient requesting to be placed on next dose and have new script sent to [facility] pharmacy. Patient ok with phone call to further discuss if needed.

## 2025-05-21 RX ORDER — TIRZEPATIDE 12.5 MG/.5ML
12.5 INJECTION, SOLUTION SUBCUTANEOUS WEEKLY
Qty: 2 ML | Refills: 0 | Status: SHIPPED | OUTPATIENT
Start: 2025-05-21

## 2025-06-16 DIAGNOSIS — E66.813 CLASS 3 SEVERE OBESITY WITH BODY MASS INDEX (BMI) OF 45.0 TO 49.9 IN ADULT, UNSPECIFIED OBESITY TYPE, UNSPECIFIED WHETHER SERIOUS COMORBIDITY PRESENT: ICD-10-CM

## 2025-06-17 RX ORDER — TIRZEPATIDE 12.5 MG/.5ML
12.5 INJECTION, SOLUTION SUBCUTANEOUS WEEKLY
Qty: 2 ML | Refills: 0 | Status: SHIPPED | OUTPATIENT
Start: 2025-06-17

## 2025-07-16 DIAGNOSIS — E66.813 CLASS 3 SEVERE OBESITY WITH BODY MASS INDEX (BMI) OF 45.0 TO 49.9 IN ADULT, UNSPECIFIED OBESITY TYPE, UNSPECIFIED WHETHER SERIOUS COMORBIDITY PRESENT: ICD-10-CM

## 2025-07-18 RX ORDER — TIRZEPATIDE 12.5 MG/.5ML
12.5 INJECTION, SOLUTION SUBCUTANEOUS WEEKLY
Qty: 2 ML | Refills: 0 | Status: SHIPPED | OUTPATIENT
Start: 2025-07-18

## 2025-08-21 ENCOUNTER — TELEPHONE (OUTPATIENT)
Dept: FAMILY MEDICINE CLINIC | Facility: CLINIC | Age: 40
End: 2025-08-21

## 2025-08-21 DIAGNOSIS — E66.813 CLASS 3 SEVERE OBESITY WITH BODY MASS INDEX (BMI) OF 45.0 TO 49.9 IN ADULT, UNSPECIFIED OBESITY TYPE, UNSPECIFIED WHETHER SERIOUS COMORBIDITY PRESENT: Primary | ICD-10-CM

## 2025-08-22 RX ORDER — TIRZEPATIDE 15 MG/.5ML
15 INJECTION, SOLUTION SUBCUTANEOUS WEEKLY
Qty: 2 ML | Refills: 0 | Status: SHIPPED | OUTPATIENT
Start: 2025-08-22